# Patient Record
Sex: FEMALE | Race: WHITE | Employment: PART TIME | URBAN - METROPOLITAN AREA
[De-identification: names, ages, dates, MRNs, and addresses within clinical notes are randomized per-mention and may not be internally consistent; named-entity substitution may affect disease eponyms.]

---

## 2017-11-14 ENCOUNTER — ANESTHESIA (OUTPATIENT)
Dept: ENDOSCOPY | Age: 72
End: 2017-11-14
Payer: COMMERCIAL

## 2017-11-14 ENCOUNTER — HOSPITAL ENCOUNTER (OUTPATIENT)
Age: 72
Setting detail: OUTPATIENT SURGERY
Discharge: HOME | End: 2017-11-14
Attending: SPECIALIST | Admitting: SPECIALIST
Payer: COMMERCIAL

## 2017-11-14 ENCOUNTER — ANESTHESIA EVENT (OUTPATIENT)
Dept: ENDOSCOPY | Age: 72
End: 2017-11-14
Payer: COMMERCIAL

## 2017-11-14 VITALS
OXYGEN SATURATION: 97 % | HEIGHT: 66 IN | WEIGHT: 222 LBS | BODY MASS INDEX: 35.68 KG/M2 | SYSTOLIC BLOOD PRESSURE: 114 MMHG | HEART RATE: 64 BPM | RESPIRATION RATE: 24 BRPM | DIASTOLIC BLOOD PRESSURE: 77 MMHG | TEMPERATURE: 97.8 F

## 2017-11-14 LAB
H PYLORI FROM TISSUE: POSITIVE
KIT LOT NO., HCLOLOT: ABNORMAL
NEGATIVE CONTROL: NEGATIVE
POSITIVE CONTROL: POSITIVE

## 2017-11-14 PROCEDURE — 74011000250 HC RX REV CODE- 250

## 2017-11-14 PROCEDURE — 74011250637 HC RX REV CODE- 250/637: Performed by: ANESTHESIOLOGY

## 2017-11-14 PROCEDURE — 88305 TISSUE EXAM BY PATHOLOGIST: CPT | Performed by: SPECIALIST

## 2017-11-14 PROCEDURE — 76040000019: Performed by: SPECIALIST

## 2017-11-14 PROCEDURE — 74011250636 HC RX REV CODE- 250/636: Performed by: PHYSICIAN ASSISTANT

## 2017-11-14 PROCEDURE — 77030009426 HC FCPS BIOP ENDOSC BSC -B: Performed by: SPECIALIST

## 2017-11-14 PROCEDURE — C1726 CATH, BAL DIL, NON-VASCULAR: HCPCS | Performed by: SPECIALIST

## 2017-11-14 PROCEDURE — 74011250636 HC RX REV CODE- 250/636

## 2017-11-14 PROCEDURE — 76060000031 HC ANESTHESIA FIRST 0.5 HR: Performed by: SPECIALIST

## 2017-11-14 PROCEDURE — 87077 CULTURE AEROBIC IDENTIFY: CPT | Performed by: PHYSICIAN ASSISTANT

## 2017-11-14 RX ORDER — MIDAZOLAM HYDROCHLORIDE 1 MG/ML
.25-5 INJECTION, SOLUTION INTRAMUSCULAR; INTRAVENOUS
Status: DISCONTINUED | OUTPATIENT
Start: 2017-11-14 | End: 2017-11-14 | Stop reason: HOSPADM

## 2017-11-14 RX ORDER — NALOXONE HYDROCHLORIDE 0.4 MG/ML
0.4 INJECTION, SOLUTION INTRAMUSCULAR; INTRAVENOUS; SUBCUTANEOUS
Status: DISCONTINUED | OUTPATIENT
Start: 2017-11-14 | End: 2017-11-14 | Stop reason: HOSPADM

## 2017-11-14 RX ORDER — PROPOFOL 10 MG/ML
INJECTION, EMULSION INTRAVENOUS AS NEEDED
Status: DISCONTINUED | OUTPATIENT
Start: 2017-11-14 | End: 2017-11-14 | Stop reason: HOSPADM

## 2017-11-14 RX ORDER — METOPROLOL TARTRATE 50 MG/1
50 TABLET ORAL EVERY 12 HOURS
Status: DISCONTINUED | OUTPATIENT
Start: 2017-11-14 | End: 2017-11-14 | Stop reason: HOSPADM

## 2017-11-14 RX ORDER — FLUMAZENIL 0.1 MG/ML
0.2 INJECTION INTRAVENOUS
Status: DISCONTINUED | OUTPATIENT
Start: 2017-11-14 | End: 2017-11-14 | Stop reason: HOSPADM

## 2017-11-14 RX ORDER — ATOMOXETINE 25 MG/1
25 CAPSULE ORAL DAILY
Status: ON HOLD | COMMUNITY
End: 2018-07-06

## 2017-11-14 RX ORDER — FENTANYL CITRATE 50 UG/ML
100 INJECTION, SOLUTION INTRAMUSCULAR; INTRAVENOUS ONCE
Status: DISCONTINUED | OUTPATIENT
Start: 2017-11-14 | End: 2017-11-14 | Stop reason: HOSPADM

## 2017-11-14 RX ORDER — SODIUM CHLORIDE 9 MG/ML
INJECTION, SOLUTION INTRAVENOUS
Status: DISCONTINUED | OUTPATIENT
Start: 2017-11-14 | End: 2017-11-14 | Stop reason: HOSPADM

## 2017-11-14 RX ORDER — ATROPINE SULFATE 0.1 MG/ML
0.5 INJECTION INTRAVENOUS
Status: DISCONTINUED | OUTPATIENT
Start: 2017-11-14 | End: 2017-11-14 | Stop reason: HOSPADM

## 2017-11-14 RX ORDER — DULOXETIN HYDROCHLORIDE 20 MG/1
20 CAPSULE, DELAYED RELEASE ORAL DAILY
COMMUNITY
End: 2021-06-18

## 2017-11-14 RX ORDER — SODIUM CHLORIDE 9 MG/ML
50 INJECTION, SOLUTION INTRAVENOUS CONTINUOUS
Status: DISCONTINUED | OUTPATIENT
Start: 2017-11-14 | End: 2017-11-14 | Stop reason: HOSPADM

## 2017-11-14 RX ORDER — EPINEPHRINE 0.1 MG/ML
1 INJECTION INTRACARDIAC; INTRAVENOUS
Status: DISCONTINUED | OUTPATIENT
Start: 2017-11-14 | End: 2017-11-14 | Stop reason: HOSPADM

## 2017-11-14 RX ORDER — LIDOCAINE HYDROCHLORIDE 20 MG/ML
INJECTION, SOLUTION EPIDURAL; INFILTRATION; INTRACAUDAL; PERINEURAL AS NEEDED
Status: DISCONTINUED | OUTPATIENT
Start: 2017-11-14 | End: 2017-11-14 | Stop reason: HOSPADM

## 2017-11-14 RX ORDER — DEXTROMETHORPHAN/PSEUDOEPHED 2.5-7.5/.8
1.2 DROPS ORAL
Status: DISCONTINUED | OUTPATIENT
Start: 2017-11-14 | End: 2017-11-14 | Stop reason: HOSPADM

## 2017-11-14 RX ORDER — PANTOPRAZOLE SODIUM 20 MG/1
20 TABLET, DELAYED RELEASE ORAL DAILY
Qty: 60 TAB | Refills: 0 | Status: SHIPPED | OUTPATIENT
Start: 2017-11-14 | End: 2018-01-13

## 2017-11-14 RX ADMIN — PROPOFOL 100 MG: 10 INJECTION, EMULSION INTRAVENOUS at 07:42

## 2017-11-14 RX ADMIN — LIDOCAINE HYDROCHLORIDE 10 MG: 20 INJECTION, SOLUTION EPIDURAL; INFILTRATION; INTRACAUDAL; PERINEURAL at 07:39

## 2017-11-14 RX ADMIN — PROPOFOL 50 MG: 10 INJECTION, EMULSION INTRAVENOUS at 07:39

## 2017-11-14 RX ADMIN — LIDOCAINE HYDROCHLORIDE 35 MG: 20 INJECTION, SOLUTION EPIDURAL; INFILTRATION; INTRACAUDAL; PERINEURAL at 07:46

## 2017-11-14 RX ADMIN — PROPOFOL 50 MG: 10 INJECTION, EMULSION INTRAVENOUS at 07:46

## 2017-11-14 RX ADMIN — SODIUM CHLORIDE: 9 INJECTION, SOLUTION INTRAVENOUS at 06:59

## 2017-11-14 RX ADMIN — PROPOFOL 40 MG: 10 INJECTION, EMULSION INTRAVENOUS at 07:41

## 2017-11-14 RX ADMIN — LIDOCAINE HYDROCHLORIDE 20 MG: 20 INJECTION, SOLUTION EPIDURAL; INFILTRATION; INTRACAUDAL; PERINEURAL at 07:42

## 2017-11-14 RX ADMIN — SODIUM CHLORIDE 50 ML/HR: 900 INJECTION, SOLUTION INTRAVENOUS at 07:26

## 2017-11-14 RX ADMIN — METOPROLOL TARTRATE 50 MG: 50 TABLET, FILM COATED ORAL at 07:24

## 2017-11-14 RX ADMIN — LIDOCAINE HYDROCHLORIDE 10 MG: 20 INJECTION, SOLUTION EPIDURAL; INFILTRATION; INTRACAUDAL; PERINEURAL at 07:41

## 2017-11-14 RX ADMIN — PROPOFOL 50 MG: 10 INJECTION, EMULSION INTRAVENOUS at 07:44

## 2017-11-14 NOTE — PROCEDURES
[address] [facility] GREG Ellis MD  [phone]      2017    Esophagogastroduodenoscopy (EGD) Procedure Note  Carrie Berman  : 1945  [facility] Medical Record Number: [id number]      Indications:    GERD  Referring Physician:  Sergio Bruce MD  Anesthesia/Sedation: Conscious Sedation/Moderate Sedation  Endoscopist:  Dr. Samuel Oconnell  Complications:  None  Estimated Blood Loss:  None    Permit:  The indications, risks, benefits and alternatives were reviewed with the patient or their decision maker who was provided an opportunity to ask questions and all questions were answered. The specific risks of esophagogastroduodenoscopy with conscious sedation were reviewed, including but not limited to anesthetic complication, bleeding, adverse drug reaction, missed lesion, infection, IV site reactions, and intestinal perforation which would lead to the need for surgical repair. Alternatives to EGD including radiographic imaging, observation without testing, or laboratory testing were reviewed as well as the limitations of those alternatives discussed. After considering the options and having all their questions answered, the patient or their decision maker provided both verbal and written consent to proceed. Procedure in Detail:  After obtaining informed consent, positioning of the patient in the left lateral decubitus position, and conduction of a pre-procedure pause or \"time out\" the endoscope was introduced into the mouth and advanced to the duodenum. A careful inspection was made, and findings or interventions are described below. Findings:   Esophagus:Acquired Schatzki's ring at GE junction, biopsied with cold forceps and dilated with 18mm to 20mm balloon. Hemostasis confirmed.   Stomach: Diffuse gastric erythema with a few erosions, biopsied for helicobacter with cold forceps. Duodenum/jejunum: normal      Specimens: See above    Impression: Gastritis, acquired schatzki's ring. Recommendations:  -Acid suppression with a proton pump inhibitor for 2 months. ,   -Await pathology. Thank you for entrusting me with this patient's care. Please do not hesitate to contact me with any questions or if I can be of assistance with any of your other patients' GI needs. Signed By: Terrell Ceja MD                        November 14, 2017     ----------------------  Pyloritec POSITIVE - eradication regimen provided.   Terrell Ceja MD

## 2017-11-14 NOTE — INTERVAL H&P NOTE
Pre-Endoscopy H&P Update  Chief complaint/HPI/ROS:  The indication for the procedure, the patient's history and the patient's current medications are reviewed prior to the procedure and that data is reported on the H&P to which this document is attached. Any significant complaints with regard to organ systems will be noted, and if not mentioned then a review of systems is not contributory. ACTUALLY this is open access EGD for GERD and globus sensation. Is using H2RA with fair to good control. No weight loss or bleeding. Past Medical History:   Diagnosis Date    ADD (attention deficit disorder with hyperactivity)     Hypertension     Hypothyroid     Insomnia     Obesity     PAF (paroxysmal atrial fibrillation) ([facility] [address]     initially in 2008, with more spells since then (last documented 7/13 at [facility])      Past Surgical History:   Procedure Laterality Date    CARDIAC CATHETERIZATION  5/08    normal    ECHO 2D ADULT  5/08    normal    HX APPENDECTOMY      HX ORTHOPAEDIC      knee, rotator cuff    HX OTHER SURGICAL      Echo 4/6/12 - normal, LVEF 60-65%      Social   Social History   Substance Use Topics    Smoking status: Former Smoker    Smokeless tobacco: Not on file    Alcohol use Yes      Comment: social      Family History   Problem Relation Age of Onset    Cancer Mother     Cancer Father       Allergies   Allergen Reactions    Sulfa (Sulfonamide Antibiotics) Other (comments)     Reports headaches. Prior to Admission Medications   Prescriptions Last Dose Informant Patient Reported? Taking? BUPROPION HCL (WELLBUTRIN PO) Not Taking at Unknown time  Yes No   Sig: Take  by mouth. DULoxetine (CYMBALTA) 20 mg capsule 11/13/2017 at Unknown time  Yes Yes   Sig: Take 20 mg by mouth daily. XARELTO 20 mg tab tablet 11/10/2017 at Unknown time  No Yes   Sig: TAKE 1 TABLET BY MOUTH DAILY   atomoxetine (STRATTERA) 25 mg capsule 11/13/2017 at Unknown time  Yes Yes   Sig: Take 25 mg by mouth daily. flecainide (TAMBOCOR) 50 mg tablet Not Taking at Unknown time  No No   Sig: Take 1 Tab by mouth two (2) times a day. hydrochlorothiazide (HYDRODIURIL) 25 mg tablet 11/13/2017 at Unknown time  Yes Yes   Sig: Take 25 mg by mouth daily. levothyroxine (SYNTHROID) 25 mcg tablet 11/13/2017 at Unknown time  Yes Yes   Sig: Take  by mouth Daily (before breakfast). metoprolol (LOPRESSOR) 50 mg tablet 11/13/2017 at Unknown time  No Yes   Sig: Take 0.5 Tabs by mouth two (2) times a day. Facility-Administered Medications: None       PHYSICAL EXAM:  The patient is examined immediately prior to the procedure. Visit Vitals    /68    Pulse (!) 57    Temp 97.7 °F (36.5 °C)    Resp 14    Ht 5' 6\" (1.676 m)    Wt 100.7 kg (222 lb)    SpO2 97%    BMI 35.83 kg/m2     Gen: Appears comfortable, no distress. Pulm: comfortable respirations with no abnormal audible breath sounds  CV: heart regular, well perfused  GI: abdomen flat. PLAN:  Informed consent discussion held, patient afforded an opportunity to ask questions and all questions answered. After being advised of the risks, benefits, and alternatives, the patient requested that we proceed and indicated so on a written consent form. Will proceed with procedure as planned.   Carmen Ortiz MD

## 2017-11-14 NOTE — H&P
67 y.o. female for open access colonoscopy for screening   Additional data for completion of the targeted pre-endoscopy H&P will be provided under 'H&P interval notes'. Please see that document which will be attached to this.   Tessy Dan MD

## 2017-11-14 NOTE — ANESTHESIA POSTPROCEDURE EVALUATION
Post-Anesthesia Evaluation and Assessment    Patient: Dariela Heller MRN: [id number]  SSN: [id number]    YOB: 1945  Age: 67 y.o. Sex: female       Cardiovascular Function/Vital Signs  Visit Vitals    /61    Pulse 61    Temp 36.6 °C (97.8 °F)    Resp 22    Ht 5' 6\" (1.676 m)    Wt 100.7 kg (222 lb)    SpO2 97%    BMI 35.83 kg/m2       Patient is status post MAC anesthesia for Procedure(s):  ESOPHAGOGASTRODUODENOSCOPY (EGD)  ESOPHAGOGASTRODUODENAL (EGD) BIOPSY  ESOPHAGEAL DILATION. Nausea/Vomiting: None    Postoperative hydration reviewed and adequate. Pain:  Pain Scale 1: Numeric (0 - 10) (11/14/17 0801)  Pain Intensity 1: 0 (11/14/17 0801)   Managed    Neurological Status: At baseline    Mental Status and Level of Consciousness: Arousable    Pulmonary Status:   O2 Device: Nasal cannula (11/14/17 0801)   Adequate oxygenation and airway patent    Complications related to anesthesia: None    Post-anesthesia assessment completed.  No concerns    Signed By: Vinod Valdes MD     November 14, 2017

## 2017-11-14 NOTE — DISCHARGE INSTRUCTIONS
[address] [facility] GREG Diehl MD  [phone]      November 14, 2017     Josephine Roberto  YOB: 1945    ENDOSCOPY DISCHARGE INSTRUCTIONS    If there is redness at IV site you should apply warm compress to area. If redness or soreness persist contact Dr. Jeannine Diehl' or your primary care doctor. Gaseous discomfort may develop, but walking, belching will help relieve this. You may not operate a vehicle for 12 hours  You may not operate machinery or dangerous appliances for rest of today  You may not drink alcoholic beverages for 12 hours  Avoid making any critical decisions for 24 hours    DIET:  You may resume your normal diet, but some patients find that heavy or large meals may lead to indigestion or vomiting. I suggest a light meal as first food intake. MEDICATIONS:  The use of some over-the-counter pain medication may lead to bleeding after biopsies or other procedures you may have had done. Tylenol (also called acetaminophen) is safe to take and will not lead to bleeding. Based on the procedure you had today you may not safely take aspirin or aspirin-like products for the next ten (10) days. ACTIVITY:  You may resume your normal household activities, but it is recommended that you spend the remainder of the day resting -  avoid any strenuous activity. CALL DR. Sin Rangel' OFFICE IF:  Increasing pain, nausea, vomiting  Abdominal distension (swelling)  Significant new or increased bleeding (oral or rectal)  Fever/Chills  Chest pain/shortness of breath                   Additional instructions:   No aspirin 10 days  Resume xarelto in 2 days if no bleeding  We found a narrowing of the esophagus and some acid irritation. Take the new stomach medication for 2 months then stop. It was an honor to be your doctor today.   Please let me or my office staff know if you have any feedback about today's procedure. Katrina Francisco MD    Pantoprazole (By mouth)   Pantoprazole (pan-TOE-pra-zole)  Treats gastroesophageal reflux disease (GERD), a damaged esophagus, and high levels of stomach acid. This medicine is a proton pump inhibitor (PPI). Brand Name(s): Protonix   There may be other brand names for this medicine. When This Medicine Should Not Be Used: This medicine is not right for everyone. Do not use it if you had an allergic reaction to pantoprazole or similar medicines. How to Use This Medicine:   Packet, Tablet, Delayed Release Tablet, Long Acting Tablet  · Your doctor will tell you how much medicine to use. Do not use more than directed. Take the medicine at least 30 minutes before a meal.  · Delayed-release tablet: Swallow the tablet whole. Do not crush, break, or chew it. · Delayed-release packet:   ¨ To prepare with applesauce:   § Mix the packet contents with 1 teaspoon of applesauce. Do not mix with water, or other liquids or food. Do not divide the packet contents to make smaller doses. § Swallow the mixture within 10 minutes after you mix it. Do not chew or crush the granules. § Sip some water after you take the mixture to make sure you swallow all of the medicine. ¨ To prepare with apple juice:   § Mix the packet contents with 1 teaspoon of apple juice in a small cup. Do not divide the packet contents to make smaller doses. § Stir for 5 seconds and drink the mixture immediately. Do not chew or crush the granules. § To make sure you get all of the medicine, add more apple juice to the cup. Drink it immediately. ¨ To prepare for a feeding tube:   § Pour the packet contents in a 2-ounce (60 milliliter [mL]) catheter-tip syringe. § Add 10 mL of apple juice to the syringe. Add the mixture to the tube. Gently tap or shake the barrel of the syringe to help empty it. § Add 10 mL of apple juice to the syringe and put it in the tube. Do this at least 2 times.  There should be no granules left in the syringe. · This medicine should come with a Medication Guide. Ask your pharmacist for a copy if you do not have one. · Missed dose: Take a dose as soon as you remember. If it is almost time for your next dose, wait until then and take a regular dose. Do not take extra medicine to make up for a missed dose. · Store the medicine in a closed container at room temperature, away from heat, moisture, and direct light. Drugs and Foods to Avoid:   Ask your doctor or pharmacist before using any other medicine, including over-the-counter medicines, vitamins, and herbal products. · Some foods and medicines can affect how pantoprazole works. Tell your doctor if you are using any of the following:   ¨ Ampicillin, atazanavir, digoxin, erlotinib, ketoconazole, methotrexate, mycophenolate mofetil, nelfinavir  ¨ Blood thinner (including warfarin)  ¨ Diuretic (water pill)  ¨ Iron supplements  Warnings While Using This Medicine:   · Tell your doctor if you are pregnant or breastfeeding, or if you have liver disease, lupus, or osteoporosis. · This medicine may cause the following problems:  ¨ Kidney problems  ¨ Low vitamin B12 or magnesium levels  ¨ Increased risk of broken bones in the hip, wrist, or spine  · This medicine can cause diarrhea. Call your doctor if the diarrhea becomes severe, does not stop, or is bloody. Do not take any medicine to stop diarrhea until you have talked to your doctor. Diarrhea can occur 2 months or more after you stop taking this medicine. · Tell any doctor or dentist who treats you that you are using this medicine. This medicine may affect certain medical test results. · Your doctor will check your progress and the effects of this medicine at regular visits. Keep all appointments. · Keep all medicine out of the reach of children. Never share your medicine with anyone.   Possible Side Effects While Using This Medicine:   Call your doctor right away if you notice any of these side effects:  · Allergic reaction: Itching or hives, swelling in your face or hands, swelling or tingling in your mouth or throat, chest tightness, trouble breathing  · Blistering, peeling, red skin rash  · Fever, joint pain, swelling in your body, unusual weight gain, change in how much or how often you urinate  · Joint pain, rash on your cheeks or arms that gets worse in the sun  · Seizures, dizziness, uneven heartbeat, muscle cramps or twitching  · Severe diarrhea, stomach cramps, fever  · Stomach pain, nausea, vomiting, weight loss  If you notice other side effects that you think are caused by this medicine, tell your doctor. Call your doctor for medical advice about side effects. You may report side effects to FDA at 7-421-FDA-9639  © 2017 [address] [employer] Information is for End User's use only and may not be sold, redistributed or otherwise used for commercial purposes. The above information is an  only. It is not intended as medical advice for individual conditions or treatments. Talk to your doctor, nurse or pharmacist before following any medical regimen to see if it is safe and effective for you.

## 2017-11-14 NOTE — IP AVS SNAPSHOT
Shaq Pascal 
 
 
 [address] 
[phone] Patient: Kayce Madrid MRN: [id number] About your hospitalization You were admitted on:  November 14, 2017 You last received care in the:  [facility] ENDOSCOPY You were discharged on:  November 14, 2017 Why you were hospitalized Your primary diagnosis was:  Not on File Discharge Orders None A check marla indicates which time of day the medication should be taken. My Medications TAKE these medications as instructed Instructions Each Dose to Equal  
 Morning Noon Evening Bedtime DULoxetine 20 mg capsule Commonly known as:  CYMBALTA Your last dose was: Your next dose is: Take 20 mg by mouth daily. 20 mg  
    
   
   
   
  
 flecainide 50 mg tablet Commonly known as:  TAMBOCOR Your last dose was: Your next dose is: Take 1 Tab by mouth two (2) times a day. 50 mg  
    
   
   
   
  
 hydroCHLOROthiazide 25 mg tablet Commonly known as:  HYDRODIURIL Your last dose was: Your next dose is: Take 25 mg by mouth daily. 25 mg  
    
   
   
   
  
 levothyroxine 25 mcg tablet Commonly known as:  SYNTHROID Your last dose was: Your next dose is: Take  by mouth Daily (before breakfast). metoprolol tartrate 50 mg tablet Commonly known as:  LOPRESSOR Your last dose was: Your next dose is: Take 0.5 Tabs by mouth two (2) times a day. 25 mg  
    
   
   
   
  
 pantoprazole 20 mg tablet Commonly known as:  PROTONIX Your last dose was: Your next dose is: Take 1 Tab by mouth daily for 60 days. 20 mg  
    
   
   
   
  
 STRATTERA 25 mg capsule Generic drug:  atomoxetine Your last dose was: Your next dose is: Take 25 mg by mouth daily. 25 mg WELLBUTRIN PO Your last dose was: Your next dose is: Take  by mouth. XARELTO 20 mg Tab tablet Generic drug:  rivaroxaban Your last dose was: Your next dose is: TAKE 1 TABLET BY MOUTH DAILY Where to Get Your Medications Information on where to get these meds will be given to you by the nurse or doctor. ! Ask your nurse or doctor about these medications  
  pantoprazole 20 mg tablet Discharge Instructions [address] Venkates[address] Abdi. Jemma Willis, [address] [place] [address] 
[phone] November 14, 2017 Henry Carreon YOB: 1945 ENDOSCOPY DISCHARGE INSTRUCTIONS If there is redness at IV site you should apply warm compress to area. If redness or soreness persist contact Dr. Jemma Willis' or your primary care doctor. Gaseous discomfort may develop, but walking, belching will help relieve this. You may not operate a vehicle for 12 hours You may not operate machinery or dangerous appliances for rest of today You may not drink alcoholic beverages for 12 hours Avoid making any critical decisions for 24 hours DIET: 
You may resume your normal diet, but some patients find that heavy or large meals may lead to indigestion or vomiting. I suggest a light meal as first food intake. MEDICATIONS: 
The use of some over-the-counter pain medication may lead to bleeding after biopsies or other procedures you may have had done. Tylenol (also called acetaminophen) is safe to take and will not lead to bleeding. Based on the procedure you had today you may not safely take aspirin or aspirin-like products for the next ten (10) days.    
 
ACTIVITY: 
You may resume your normal household activities, but it is recommended that you spend the remainder of the day resting -  avoid any strenuous activity. CALL DR. Rossy Bearden' OFFICE IF: Increasing pain, nausea, vomiting Abdominal distension (swelling) Significant new or increased bleeding (oral or rectal) Fever/Chills Chest pain/shortness of breath Additional instructions:  
No aspirin 10 days Resume xarelto in 2 days if no bleeding We found a narrowing of the esophagus and some acid irritation. Take the new stomach medication for 2 months then stop. It was an honor to be your doctor today. Please let me or my office staff know if you have any feedback about today's procedure. Carmen Fernandez MD 
 
Pantoprazole (By mouth) Pantoprazole (pan-TOE-pra-zole) Treats gastroesophageal reflux disease (GERD), a damaged esophagus, and high levels of stomach acid. This medicine is a proton pump inhibitor (PPI). Brand Name(s): Protonix There may be other brand names for this medicine. When This Medicine Should Not Be Used: This medicine is not right for everyone. Do not use it if you had an allergic reaction to pantoprazole or similar medicines. How to Use This Medicine:  
Packet, Tablet, Delayed Release Tablet, Long Acting Tablet · Your doctor will tell you how much medicine to use. Do not use more than directed. Take the medicine at least 30 minutes before a meal. 
· Delayed-release tablet: Swallow the tablet whole. Do not crush, break, or chew it. · Delayed-release packet: ¨ To prepare with applesauce: § Mix the packet contents with 1 teaspoon of applesauce. Do not mix with water, or other liquids or food. Do not divide the packet contents to make smaller doses. § Swallow the mixture within 10 minutes after you mix it. Do not chew or crush the granules. § Sip some water after you take the mixture to make sure you swallow all of the medicine. ¨ To prepare with apple juice: § Mix the packet contents with 1 teaspoon of apple juice in a small cup. Do not divide the packet contents to make smaller doses. § Stir for 5 seconds and drink the mixture immediately. Do not chew or crush the granules. § To make sure you get all of the medicine, add more apple juice to the cup. Drink it immediately. ¨ To prepare for a feeding tube: § Pour the packet contents in a 2-ounce (60 milliliter [mL]) catheter-tip syringe. § Add 10 mL of apple juice to the syringe. Add the mixture to the tube. Gently tap or shake the barrel of the syringe to help empty it. § Add 10 mL of apple juice to the syringe and put it in the tube. Do this at least 2 times. There should be no granules left in the syringe. · This medicine should come with a Medication Guide. Ask your pharmacist for a copy if you do not have one. · Missed dose: Take a dose as soon as you remember. If it is almost time for your next dose, wait until then and take a regular dose. Do not take extra medicine to make up for a missed dose. · Store the medicine in a closed container at room temperature, away from heat, moisture, and direct light. Drugs and Foods to Avoid: Ask your doctor or pharmacist before using any other medicine, including over-the-counter medicines, vitamins, and herbal products. · Some foods and medicines can affect how pantoprazole works. Tell your doctor if you are using any of the following: ¨ Ampicillin, atazanavir, digoxin, erlotinib, ketoconazole, methotrexate, mycophenolate mofetil, nelfinavir ¨ Blood thinner (including warfarin) ¨ Diuretic (water pill) ¨ Iron supplements Warnings While Using This Medicine: · Tell your doctor if you are pregnant or breastfeeding, or if you have liver disease, lupus, or osteoporosis. · This medicine may cause the following problems: ¨ Kidney problems ¨ Low vitamin B12 or magnesium levels ¨ Increased risk of broken bones in the hip, wrist, or spine · This medicine can cause diarrhea. Call your doctor if the diarrhea becomes severe, does not stop, or is bloody. Do not take any medicine to stop diarrhea until you have talked to your doctor. Diarrhea can occur 2 months or more after you stop taking this medicine. · Tell any doctor or dentist who treats you that you are using this medicine. This medicine may affect certain medical test results. · Your doctor will check your progress and the effects of this medicine at regular visits. Keep all appointments. · Keep all medicine out of the reach of children. Never share your medicine with anyone. Possible Side Effects While Using This Medicine:  
Call your doctor right away if you notice any of these side effects: · Allergic reaction: Itching or hives, swelling in your face or hands, swelling or tingling in your mouth or throat, chest tightness, trouble breathing · Blistering, peeling, red skin rash · Fever, joint pain, swelling in your body, unusual weight gain, change in how much or how often you urinate · Joint pain, rash on your cheeks or arms that gets worse in the sun · Seizures, dizziness, uneven heartbeat, muscle cramps or twitching · Severe diarrhea, stomach cramps, fever · Stomach pain, nausea, vomiting, weight loss If you notice other side effects that you think are caused by this medicine, tell your doctor. Call your doctor for medical advice about side effects. You may report side effects to FDA at 5-637-FDA-0718 © 2017 [address] Information is for End User's use only and may not be sold, redistributed or otherwise used for commercial purposes. The above information is an  only. It is not intended as medical advice for individual conditions or treatments. Talk to your doctor, nurse or pharmacist before following any medical regimen to see if it is safe and effective for you. Introducing [facility]! Sharita Agee introduces [facility] patient portal. Now you can access parts of your medical record, email your doctor's office, and request medication refills online. 1. In your internet browser, go to [email or web address] 2. Click on the First Time User? Click Here link in the Sign In box. You will see the New Member Sign Up page. 3. Enter your [facility] Access Code exactly as it appears below. You will not need to use this code after youve completed the sign-up process. If you do not sign up before the expiration date, you must request a new code. · [facility] [facility] Access Code: [email or web address] Expires: 2/12/2018  6:04 AM 
 
4. Enter the last four digits of your Social Security Number (xxxx) and Date of Birth (mm/dd/yyyy) as indicated and click Submit. You will be taken to the next sign-up page. 5. Create a [facility] ID. This will be your [facility] login ID and cannot be changed, so think of one that is secure and easy to remember. 6. Create a [facility] password. You can change your password at any time. 7. Enter your Password Reset Question and Answer. This can be used at a later time if you forget your password. 8. Enter your e-mail address. You will receive e-mail notification when new information is available in [address] 9. Click Sign Up. You can now view and download portions of your medical record. 10. Click the Download Summary menu link to download a portable copy of your medical information. If you have questions, please visit the Frequently Asked Questions section of the [facility] website. Remember, [facility] is NOT to be used for urgent needs. For medical emergencies, dial 911. Now available from your iPhone and Android! Providers Seen During Your Hospitalization Provider Specialty Primary office phone Carmen Fernandez MD Gastroenterology [phone] Your Primary Care Physician (PCP) Primary Care Physician Office Phone Office Fax Jordyn Naranjo [phone] You are allergic to the following Allergen Reactions Sulfa (Sulfonamide Antibiotics) Other (comments) Reports headaches. Recent Documentation Height Weight BMI OB Status Smoking Status 1.676 m 100.7 kg 35.83 kg/m2 Postmenopausal Former Smoker Emergency Contacts Name Discharge Info Relation Home Work Mobile [address] CAREGIVER [3] Spouse [3] [phone] Patient Belongings The following personal items are in your possession at time of discharge: 
  Dental Appliances: Partials  Visual Aid: None Please provide this summary of care documentation to your next provider. Signatures-by signing, you are acknowledging that this After Visit Summary has been reviewed with you and you have received a copy. Patient Signature:  ____________________________________________________________ Date:  ____________________________________________________________  
  
[facility] Provider Signature:  ____________________________________________________________ Date:  ____________________________________________________________

## 2017-11-14 NOTE — PERIOP NOTES
Patient medication teaching was done for Pantoprazole and pylera. [address] were included with discharge instructions. Opportunities for questions given and clarification was provided. patient and spouse verbalized understanding and acceptance. Medication side effects guide given and reviewed with patient and spouse.

## 2018-07-05 RX ORDER — DOXYCYCLINE HYCLATE 50 MG/1
50 CAPSULE ORAL 2 TIMES DAILY
Status: ON HOLD | COMMUNITY
End: 2020-08-28

## 2018-07-06 ENCOUNTER — ANESTHESIA EVENT (OUTPATIENT)
Dept: ENDOSCOPY | Age: 73
End: 2018-07-06
Payer: COMMERCIAL

## 2018-07-06 ENCOUNTER — HOSPITAL ENCOUNTER (OUTPATIENT)
Age: 73
Setting detail: OUTPATIENT SURGERY
Discharge: HOME | End: 2018-07-06
Attending: SPECIALIST | Admitting: SPECIALIST
Payer: COMMERCIAL

## 2018-07-06 ENCOUNTER — ANESTHESIA (OUTPATIENT)
Dept: ENDOSCOPY | Age: 73
End: 2018-07-06
Payer: COMMERCIAL

## 2018-07-06 VITALS
RESPIRATION RATE: 16 BRPM | WEIGHT: 222 LBS | TEMPERATURE: 98.2 F | HEART RATE: 59 BPM | DIASTOLIC BLOOD PRESSURE: 67 MMHG | SYSTOLIC BLOOD PRESSURE: 120 MMHG | HEIGHT: 67 IN | BODY MASS INDEX: 34.84 KG/M2 | OXYGEN SATURATION: 97 %

## 2018-07-06 PROCEDURE — 74011250636 HC RX REV CODE- 250/636

## 2018-07-06 PROCEDURE — 88305 TISSUE EXAM BY PATHOLOGIST: CPT | Performed by: SPECIALIST

## 2018-07-06 PROCEDURE — 74011000250 HC RX REV CODE- 250

## 2018-07-06 PROCEDURE — 74011250637 HC RX REV CODE- 250/637: Performed by: SPECIALIST

## 2018-07-06 PROCEDURE — 77030013992 HC SNR POLYP ENDOSC BSC -B: Performed by: SPECIALIST

## 2018-07-06 PROCEDURE — 74011250636 HC RX REV CODE- 250/636: Performed by: SPECIALIST

## 2018-07-06 PROCEDURE — 76040000019: Performed by: SPECIALIST

## 2018-07-06 PROCEDURE — 76060000031 HC ANESTHESIA FIRST 0.5 HR: Performed by: SPECIALIST

## 2018-07-06 RX ORDER — DEXTROMETHORPHAN/PSEUDOEPHED 2.5-7.5/.8
1.2 DROPS ORAL
Status: DISCONTINUED | OUTPATIENT
Start: 2018-07-06 | End: 2018-07-06 | Stop reason: HOSPADM

## 2018-07-06 RX ORDER — MELATONIN
3000 DAILY
COMMUNITY

## 2018-07-06 RX ORDER — PROPOFOL 10 MG/ML
INJECTION, EMULSION INTRAVENOUS
Status: DISCONTINUED | OUTPATIENT
Start: 2018-07-06 | End: 2018-07-06 | Stop reason: HOSPADM

## 2018-07-06 RX ORDER — LANOLIN ALCOHOL/MO/W.PET/CERES
1000 CREAM (GRAM) TOPICAL DAILY
COMMUNITY

## 2018-07-06 RX ORDER — MIDAZOLAM HYDROCHLORIDE 1 MG/ML
.25-5 INJECTION, SOLUTION INTRAMUSCULAR; INTRAVENOUS AS NEEDED
Status: DISCONTINUED | OUTPATIENT
Start: 2018-07-06 | End: 2018-07-06 | Stop reason: HOSPADM

## 2018-07-06 RX ORDER — SODIUM CHLORIDE 9 MG/ML
50 INJECTION, SOLUTION INTRAVENOUS CONTINUOUS
Status: DISCONTINUED | OUTPATIENT
Start: 2018-07-06 | End: 2018-07-06 | Stop reason: HOSPADM

## 2018-07-06 RX ORDER — LIDOCAINE HYDROCHLORIDE 20 MG/ML
INJECTION, SOLUTION EPIDURAL; INFILTRATION; INTRACAUDAL; PERINEURAL AS NEEDED
Status: DISCONTINUED | OUTPATIENT
Start: 2018-07-06 | End: 2018-07-06 | Stop reason: HOSPADM

## 2018-07-06 RX ORDER — SODIUM CHLORIDE 9 MG/ML
INJECTION, SOLUTION INTRAVENOUS
Status: DISCONTINUED | OUTPATIENT
Start: 2018-07-06 | End: 2018-07-06 | Stop reason: HOSPADM

## 2018-07-06 RX ORDER — FENTANYL CITRATE 50 UG/ML
25 INJECTION, SOLUTION INTRAMUSCULAR; INTRAVENOUS AS NEEDED
Status: DISCONTINUED | OUTPATIENT
Start: 2018-07-06 | End: 2018-07-06 | Stop reason: HOSPADM

## 2018-07-06 RX ORDER — BISMUTH SUBSALICYLATE 262 MG
1 TABLET,CHEWABLE ORAL DAILY
COMMUNITY

## 2018-07-06 RX ORDER — PROPOFOL 10 MG/ML
INJECTION, EMULSION INTRAVENOUS AS NEEDED
Status: DISCONTINUED | OUTPATIENT
Start: 2018-07-06 | End: 2018-07-06 | Stop reason: HOSPADM

## 2018-07-06 RX ORDER — NALOXONE HYDROCHLORIDE 0.4 MG/ML
0.4 INJECTION, SOLUTION INTRAMUSCULAR; INTRAVENOUS; SUBCUTANEOUS
Status: DISCONTINUED | OUTPATIENT
Start: 2018-07-06 | End: 2018-07-06 | Stop reason: HOSPADM

## 2018-07-06 RX ORDER — FLUMAZENIL 0.1 MG/ML
0.2 INJECTION INTRAVENOUS
Status: DISCONTINUED | OUTPATIENT
Start: 2018-07-06 | End: 2018-07-06 | Stop reason: HOSPADM

## 2018-07-06 RX ADMIN — SIMETHICONE 80 MG: 20 SUSPENSION/ DROPS ORAL at 08:37

## 2018-07-06 RX ADMIN — SODIUM CHLORIDE 50 ML/HR: 900 INJECTION, SOLUTION INTRAVENOUS at 08:00

## 2018-07-06 RX ADMIN — PROPOFOL 120 MCG/KG/MIN: 10 INJECTION, EMULSION INTRAVENOUS at 08:27

## 2018-07-06 RX ADMIN — SODIUM CHLORIDE: 9 INJECTION, SOLUTION INTRAVENOUS at 08:21

## 2018-07-06 RX ADMIN — PROPOFOL 70 MG: 10 INJECTION, EMULSION INTRAVENOUS at 08:27

## 2018-07-06 RX ADMIN — LIDOCAINE HYDROCHLORIDE 50 MG: 20 INJECTION, SOLUTION EPIDURAL; INFILTRATION; INTRACAUDAL; PERINEURAL at 08:27

## 2018-07-06 NOTE — PROCEDURES
[address] GREG Billings MD  [phone]      2018    Colonoscopy Procedure Note  Arabella Bynum  :  1945  [place] Medical Record Number: [id number]    Indications:     Screening colonoscopy  PCP:  Marcos Wylie MD  Anesthesia/Sedation: Conscious Sedation/Moderate Sedation  Endoscopist:  Dr. Kadie Ramon  Complications:  None  Estimated Blood Loss:  None    Permit:  The indications, risks, benefits and alternatives were reviewed with the patient or their decision maker who was provided an opportunity to ask questions and all questions were answered. The specific risks of colonoscopy with conscious sedation were reviewed, including but not limited to anesthetic complication, bleeding, adverse drug reaction, missed lesion, infection, IV site reactions, and intestinal perforation which would lead to the need for surgical repair. Alternatives to colonoscopy including radiographic imaging, observation without testing, or laboratory testing were reviewed including the limitations of those alternatives. After considering the options and having all their questions answered, the patient or their decision maker provided both verbal and written consent to proceed. Procedure in Detail:  After obtaining informed consent, positioning of the patient in the left lateral decubitus position, and conduction of a pre-procedure pause or \"time out\" the endoscope was introduced into the anus and advanced to the cecum, which was identified by the ileocecal valve and appendiceal orifice. The quality of the colonic preparation was good. A careful inspection was made as the colonoscope was withdrawn, findings and interventions are described below. Findings:   3mm polyp of the ascending colon removed with cold snare and all samples retrieved.   In the rectum, medium internal hemorrhoids are noted without bleeding. Specimens:    See above    Complications:   None; patient tolerated the procedure well. Impression:  Colon polyp removed    Recommendations:     - Await pathology. Thank you for entrusting me with this patient's care. Please do not hesitate to contact me with any questions or if I can be of assistance with any of your other patients' GI needs.     Signed By: Dieudonne Jaramillo MD                        July 6, 2018

## 2018-07-06 NOTE — INTERVAL H&P NOTE
Pre-Endoscopy H&P Update  Chief complaint/HPI/ROS:  The indication for the procedure, the patient's history and the patient's current medications are reviewed prior to the procedure and that data is reported on the H&P to which this document is attached. Any significant complaints with regard to organ systems will be noted, and if not mentioned then a review of systems is not contributory. Past Medical History:   Diagnosis Date    ADD (attention deficit disorder with hyperactivity)     GERD (gastroesophageal reflux disease)     Hypertension     Hypothyroid     Insomnia     Obesity     PAF (paroxysmal atrial fibrillation) ([facility] [address]     initially in 2008, with more spells since then (last documented 7/13 at [facility])      Past Surgical History:   Procedure Laterality Date    CARDIAC CATHETERIZATION  5/08    normal    ECHO 2D ADULT  5/08    normal    HX APPENDECTOMY      HX ORTHOPAEDIC Bilateral 2006, 2005    rotator cuff    HX ORTHOPAEDIC Bilateral 2008, 2008    knee replacement    HX OTHER SURGICAL      Echo 4/6/12 - normal, LVEF 60-65%      Social   Social History   Substance Use Topics    Smoking status: Former Smoker     Years: 10.00     Quit date: 1979    Smokeless tobacco: Never Used    Alcohol use Yes      Comment: social,        Family History   Problem Relation Age of Onset    Cancer Mother     Cancer Father       Allergies   Allergen Reactions    Sulfa (Sulfonamide Antibiotics) Other (comments)     Reports headaches. Prior to Admission Medications   Prescriptions Last Dose Informant Patient Reported? Taking? DULoxetine (CYMBALTA) 20 mg capsule 7/5/2018 at am  Yes Yes   Sig: Take 20 mg by mouth daily. XARELTO 20 mg tab tablet 7/2/2018 at am  No No   Sig: TAKE 1 TABLET BY MOUTH DAILY   cholecalciferol (VITAMIN D3) 1,000 unit tablet   Yes Yes   Sig: Take 1,000 Units by mouth daily. cyanocobalamin (VITAMIN B-12) 1,000 mcg tablet   Yes Yes   Sig: Take 1,000 mcg by mouth daily.    doxycycline (VIBRAMYCIN) 50 mg capsule 7/5/2018 at am  Yes Yes   Sig: Take 50 mg by mouth two (2) times a day. flecainide (TAMBOCOR) 50 mg tablet 5/23/2018  No No   Sig: Take 1 Tab by mouth two (2) times a day. Patient taking differently: Take 50 mg by mouth two (2) times daily as needed. Indications: PAROXYSMAL ATRIAL FIBRILLATION   hydrochlorothiazide (HYDRODIURIL) 25 mg tablet 7/5/2018 at am  Yes Yes   Sig: Take 25 mg by mouth daily. levothyroxine (SYNTHROID) 25 mcg tablet 7/5/2018 at am  Yes Yes   Sig: Take  by mouth Daily (before breakfast). metoprolol (LOPRESSOR) 50 mg tablet 7/6/2018 at Unknown time  No Yes   Sig: Take 0.5 Tabs by mouth two (2) times a day. Patient taking differently: Take 50 mg by mouth two (2) times a day. multivitamin (ONE A DAY) tablet   Yes Yes   Sig: Take 1 Tab by mouth daily. Facility-Administered Medications: None       PHYSICAL EXAM:  The patient is examined immediately prior to the procedure. Visit Vitals    /67    Pulse (!) 59    Temp 98.2 °F (36.8 °C)    Resp 16    Ht 5' 7\" (1.702 m)    Wt 100.7 kg (222 lb)    SpO2 97%    Breastfeeding No    BMI 34.77 kg/m2     Gen: Appears comfortable, no distress. Pulm: comfortable respirations with no abnormal audible breath sounds  CV: heart regular, well perfused  GI: abdomen flat. PLAN:  Informed consent discussion held, patient afforded an opportunity to ask questions and all questions answered. After being advised of the risks, benefits, and alternatives, the patient requested that we proceed and indicated so on a written consent form. Will proceed with procedure as planned.   Victor Manuel Washburn MD

## 2018-07-06 NOTE — H&P
68 y.o. female for open access colonoscopy for screening   Additional data for completion of the targeted pre-endoscopy H&P will be provided under 'H&P interval notes'. Please see that document which will be attached to this.   Ivan Medina MD

## 2018-07-06 NOTE — IP AVS SNAPSHOT
[address] 
[phone] Patient: Damian Virgen MRN: [id number] About your hospitalization You were admitted on:  July 6, 2018 You last received care in the:  [facility] ENDOSCOPY You were discharged on:  July 6, 2018 Why you were hospitalized Your primary diagnosis was:  Not on File Follow-up Information None Discharge Orders None A check marla indicates which time of day the medication should be taken. My Medications CHANGE how you take these medications Instructions Each Dose to Equal  
 Morning Noon Evening Bedtime  
 flecainide 50 mg tablet Commonly known as:  TAMBOCOR What changed:   
- when to take this 
- reasons to take this Your last dose was: Your next dose is: Take 1 Tab by mouth two (2) times a day. 50 mg  
    
   
   
   
  
 metoprolol tartrate 50 mg tablet Commonly known as:  LOPRESSOR What changed:  how much to take Your last dose was: Your next dose is: Take 0.5 Tabs by mouth two (2) times a day. 25 mg CONTINUE taking these medications Instructions Each Dose to Equal  
 Morning Noon Evening Bedtime  
 doxycycline 50 mg capsule Commonly known as:  VIBRAMYCIN Your last dose was: Your next dose is: Take 50 mg by mouth two (2) times a day. 50 mg DULoxetine 20 mg capsule Commonly known as:  CYMBALTA Your last dose was: Your next dose is: Take 20 mg by mouth daily. 20 mg  
    
   
   
   
  
 hydroCHLOROthiazide 25 mg tablet Commonly known as:  HYDRODIURIL Your last dose was: Your next dose is: Take 25 mg by mouth daily. 25 mg  
    
   
   
   
  
 levothyroxine 25 mcg tablet Commonly known as:  SYNTHROID  
   
 Your last dose was: Your next dose is: Take  by mouth Daily (before breakfast). multivitamin tablet Commonly known as:  ONE A DAY Your last dose was: Your next dose is: Take 1 Tab by mouth daily. 1 Tab VITAMIN B-12 1,000 mcg tablet Generic drug:  cyanocobalamin Your last dose was: Your next dose is: Take 1,000 mcg by mouth daily. 1000 mcg VITAMIN D3 1,000 unit tablet Generic drug:  cholecalciferol Your last dose was: Your next dose is: Take 1,000 Units by mouth daily. 1000 Units XARELTO 20 mg Tab tablet Generic drug:  rivaroxaban Your last dose was: Your next dose is: TAKE 1 TABLET BY MOUTH DAILY Discharge Instructions [address] Zerita Phoenix. Alec Brothers, [address] [place] [address] 
[phone] July 6, 2018 [facility] YOB: 1945 COLONOSCOPY DISCHARGE INSTRUCTIONS If there is redness at IV site you should apply warm compress to area. If redness or soreness persist contact Dr. Alec Brothers' or your primary care doctor. There may be a slight amount of blood passed from the rectum. Gaseous discomfort may develop, but walking, belching will help relieve this. You may not operate a vehicle for 12 hours You may not operate machinery or dangerous appliances for rest of today You may not drink alcoholic beverages for 12 hours Avoid making any critical decisions for 24 hours DIET: 
You may resume your normal diet, but some patients find that heavy or large meals may lead to indigestion or vomiting. I suggest a light meal as first food intake.  
 
MEDICATIONS: 
The use of some over-the-counter pain medication may lead to bleeding after colon biopsies or polyp removal.  Tylenol (also called acetaminophen) is safe to take even if you have had colonoscopy with polyp removal.  Based on the procedure you had today you may not safely take aspirin or aspirin-like products for the next ten (10) days. Remember that Tylenol (also called acetaminophen) is safe to take after colonoscopy even if you have had biopsies or polyps removed. ACTIVITY: 
You may resume your normal household activities, but it is recommended that you spend the remainder of the day resting -  avoid any strenuous activity. CALL DR. Kandi Segovia' OFFICE IF: Increasing pain, nausea, vomiting Abdominal distension (swelling) Significant new or increased bleeding (oral or rectal) Fever/Chills Chest pain/shortness of breath Additional instructions: We found one small polyp and removed that today I will contact you with the polyp results and give advice about next colonoscopy based on those results Hold aspirin 10 days You can resume your anticoagulant medication in 2 days (on Sunday) if there is no bleeding in that interval.  
 
It was an honor to be your doctor today. Please let me or my office staff know if you have any feedback about today's procedure. Jalil Trujillo MD 
 
Colonoscopy saves lives, and can prevent colon cancer. Everyone aged 48 or older needs colonoscopy. Tell your family and friends: get the test! 
 
 
 
 
  
  
  
Introducing IPLocks! [facility] introduces [employer] patient portal. Now you can access parts of your medical record, email your doctor's office, and request medication refills online. 1. In your internet browser, go to [email or web address] 2. Click on the First Time User? Click Here link in the Sign In box. You will see the New Member Sign Up page. 3. Enter your [employer] Access Code exactly as it appears below.  You will not need to use this code after youve completed the sign-up process. If you do not sign up before the expiration date, you must request a new code. · [employer] Access Code: [email or web address] Expires: 10/4/2018  7:00 AM 
 
4. Enter the last four digits of your Social Security Number (xxxx) and Date of Birth (mm/dd/yyyy) as indicated and click Submit. You will be taken to the next sign-up page. 5. Create a [employer] ID. This will be your [employer] login ID and cannot be changed, so think of one that is secure and easy to remember. 6. Create a [employer] password. You can change your password at any time. 7. Enter your Password Reset Question and Answer. This can be used at a later time if you forget your password. 8. Enter your e-mail address. You will receive e-mail notification when new information is available in [address] 9. Click Sign Up. You can now view and download portions of your medical record. 10. Click the Download Summary menu link to download a portable copy of your medical information. If you have questions, please visit the Frequently Asked Questions section of the [employer] website. Remember, [employer] is NOT to be used for urgent needs. For medical emergencies, dial 911. Now available from your iPhone and Android! Introducing Robert Alfaro As a [employer] patient, I wanted to make you aware of our electronic visit tool called Robert Alfaro. [employer] 24/7 allows you to connect within minutes with a medical provider 24 hours a day, seven days a week via a mobile device or tablet or logging into a secure website from your computer. You can access Robert Alfaro from anywhere in the [place].  
 
A virtual visit might be right for you when you have a simple condition and feel like you just dont want to get out of bed, or cant get away from work for an appointment, when your regular [employer] provider is not available (evenings, weekends or holidays), or when youre out of town and need minor care. Electronic visits cost only $49 and if the [email or web address] 24/7 provider determines a prescription is needed to treat your condition, one can be electronically transmitted to a nearby pharmacy*. Please take a moment to enroll today if you have not already done so. The enrollment process is free and takes just a few minutes. To enroll, please download the Plug[employer]dj 24/7 bobby to your tablet or phone, or visit [email or web address] to enroll on your computer. And, as an [address] [place] [address] patient with a [employer] account, the results of your visits will be scanned into your electronic medical record and your primary care provider will be able to view the scanned results. We urge you to continue to see your regular [email or web address] provider for your ongoing medical care. And while your primary care provider may not be the one available when you seek a Fengguo virtual visit, the peace of mind you get from getting a real diagnosis real time can be priceless. For more information on Fengguo, view our Frequently Asked Questions (FAQs) at [email or web address]. Sincerely, 
 
Lucien Duffy MD 
Chief Medical Officer Owen8 Anjana Naqvi *:  certain medications cannot be prescribed via Fengguo Providers Seen During Your Hospitalization Provider Specialty Primary office phone Belen Patel MD Gastroenterology [phone] Your Primary Care Physician (PCP) Primary Care Physician Office Phone Office Fax Brenda De La Cruz [phone] You are allergic to the following Allergen Reactions Sulfa (Sulfonamide Antibiotics) Other (comments) Reports headaches. Recent Documentation Height Weight Breastfeeding? BMI OB Status Smoking Status 1.702 m 100.7 kg No 34.77 kg/m2 Postmenopausal Former Smoker Emergency Contacts Name Discharge Info Relation Home Work Mobile [address] CAREGIVER [3] Spouse [3] [phone] Patient Belongings The following personal items are in your possession at time of discharge: 
  Dental Appliances: Lowers, Partials  Visual Aid: None Please provide this summary of care documentation to your next provider. Signatures-by signing, you are acknowledging that this After Visit Summary has been reviewed with you and you have received a copy. Patient Signature:  ____________________________________________________________ Date:  ____________________________________________________________  
  
[facility] Provider Signature:  ____________________________________________________________ Date:  ____________________________________________________________

## 2018-07-06 NOTE — ANESTHESIA PREPROCEDURE EVALUATION
Anesthetic History   No history of anesthetic complications            Review of Systems / Medical History  Patient summary reviewed, nursing notes reviewed and pertinent labs reviewed    Pulmonary  Within defined limits                 Neuro/Psych   Within defined limits           Cardiovascular  Within defined limits  Hypertension: well controlled        Dysrhythmias : atrial fibrillation           GI/Hepatic/Renal  Within defined limits   GERD: well controlled           Endo/Other  Within defined limits    Hypothyroidism  Obesity     Other Findings              Physical Exam    Airway  Mallampati: I  TM Distance: 4 - 6 cm  Neck ROM: normal range of motion   Mouth opening: Normal     Cardiovascular    Rhythm: regular  Rate: normal         Dental  No notable dental hx       Pulmonary  Breath sounds clear to auscultation               Abdominal         Other Findings            Anesthetic Plan    ASA: 2  Anesthesia type: MAC            Anesthetic plan and risks discussed with: Patient

## 2018-07-06 NOTE — ROUTINE PROCESS
Carl Lamb [facility]  1945  [id number]    Situation:  Verbal report received from: Layton Crespo RN  Procedure: Procedure(s):  COLONOSCOPY  ENDOSCOPIC POLYPECTOMY    Background:    Preoperative diagnosis: FAMILY HISTORY OF COLON CANCER   Postoperative diagnosis: Polyp, hemorrhoids. :  Dr. Issa Hall  Assistant(s): Endoscopy Technician-1: Rona White  Endoscopy RN-1: Layton Crespo RN    Specimens:   ID Type Source Tests Collected by Time Destination   1 : ascending colon polyp Preservative Colon, Ascending  Cat Degroot MD 7/6/2018 7962 Pathology     H. Pylori  no    Assessment:  Intra-procedure medications     Anesthesia gave intra-procedure sedation and medications, see anesthesia flow sheet yes    Intravenous fluids: NS@ KVO     Vital signs stable     Abdominal assessment: round and soft     Recommendation:  Discharge patient per MD order.   Family or Friend   Permission to share finding with family or friend yes

## 2018-07-06 NOTE — DISCHARGE INSTRUCTIONS
[address] [facility] GREG Morales MD  [phone]      July 6, 2018    Jose Turcios  YOB: 1945    COLONOSCOPY DISCHARGE INSTRUCTIONS    If there is redness at IV site you should apply warm compress to area. If redness or soreness persist contact Dr. Yvan Morales' or your primary care doctor. There may be a slight amount of blood passed from the rectum. Gaseous discomfort may develop, but walking, belching will help relieve this. You may not operate a vehicle for 12 hours  You may not operate machinery or dangerous appliances for rest of today  You may not drink alcoholic beverages for 12 hours  Avoid making any critical decisions for 24 hours    DIET:  You may resume your normal diet, but some patients find that heavy or large meals may lead to indigestion or vomiting. I suggest a light meal as first food intake. MEDICATIONS:  The use of some over-the-counter pain medication may lead to bleeding after colon biopsies or polyp removal.  Tylenol (also called acetaminophen) is safe to take even if you have had colonoscopy with polyp removal.  Based on the procedure you had today you may not safely take aspirin or aspirin-like products for the next ten (10) days. Remember that Tylenol (also called acetaminophen) is safe to take after colonoscopy even if you have had biopsies or polyps removed. ACTIVITY:  You may resume your normal household activities, but it is recommended that you spend the remainder of the day resting -  avoid any strenuous activity. CALL DR. Diann Eubanks' OFFICE IF:  Increasing pain, nausea, vomiting  Abdominal distension (swelling)  Significant new or increased bleeding (oral or rectal)  Fever/Chills  Chest pain/shortness of breath                       Additional instructions:    We found one small polyp and removed that today  I will contact you with the polyp results and give advice about next colonoscopy based on those results  Hold aspirin 10 days  You can resume your anticoagulant medication in 2 days (on Sunday) if there is no bleeding in that interval.     It was an honor to be your doctor today. Please let me or my office staff know if you have any feedback about today's procedure. Joi Block MD    Colonoscopy saves lives, and can prevent colon cancer. Everyone aged 48 or older needs colonoscopy.   Tell your family and friends: get the test!

## 2018-07-06 NOTE — PERIOP NOTES
8591  Anesthesia staff at patient's bedside administering anesthesia and monitoring patients vital signs throughout procedure. See anesthesia note. 7896  Endoscope was pre-cleaned at bedside immediately following procedure by Manisha Fajardo endo tech. 6084  Patient tolerated procedure without problems. Abdomen soft and patient arousable and voices no complaints Report received from CRNA, see anesthesia note. Patient transported to endoscopy recovery area. Report given to Tiffani Bazan RN.

## 2018-07-06 NOTE — ANESTHESIA POSTPROCEDURE EVALUATION
Post-Anesthesia Evaluation and Assessment    Patient: Hung Ratliff MRN: [id number]  SSN: [id number]    YOB: 1945  Age: 68 y.o. Sex: female       Cardiovascular Function/Vital Signs  Visit Vitals    /67    Pulse (!) 59    Temp 36.8 °C (98.2 °F)    Resp 16    Ht 5' 7\" (1.702 m)    Wt 100.7 kg (222 lb)    SpO2 97%    Breastfeeding No    BMI 34.77 kg/m2       Patient is status post MAC anesthesia for Procedure(s):  COLONOSCOPY  ENDOSCOPIC POLYPECTOMY. Nausea/Vomiting: None    Postoperative hydration reviewed and adequate. Pain:  Pain Scale 1: Numeric (0 - 10) (07/06/18 0755)  Pain Intensity 1: 0 (07/06/18 0755)   Managed    Neurological Status: At baseline    Mental Status and Level of Consciousness: Arousable    Pulmonary Status:   O2 Device: Room air (07/06/18 0755)   Adequate oxygenation and airway patent    Complications related to anesthesia: None    Post-anesthesia assessment completed.  No concerns    Signed By: Carlos Rahman MD     July 6, 2018

## 2019-11-15 ENCOUNTER — HOSPITAL ENCOUNTER (OUTPATIENT)
Dept: CT IMAGING | Age: 74
Discharge: HOME | End: 2019-11-15
Attending: NURSE PRACTITIONER
Payer: COMMERCIAL

## 2019-11-15 DIAGNOSIS — R13.19 ESOPHAGEAL DYSPHAGIA: ICD-10-CM

## 2019-11-15 DIAGNOSIS — R19.4 CHANGE IN BOWEL HABIT: ICD-10-CM

## 2019-11-15 DIAGNOSIS — R10.13 EPIGASTRIC PAIN: ICD-10-CM

## 2019-11-15 DIAGNOSIS — R15.9 INCONTINENCE OF FECES: ICD-10-CM

## 2019-11-15 LAB — CREAT BLD-MCNC: 1 MG/DL (ref 0.6–1.3)

## 2019-11-15 PROCEDURE — 74177 CT ABD & PELVIS W/CONTRAST: CPT

## 2019-11-15 PROCEDURE — 82565 ASSAY OF CREATININE: CPT

## 2019-11-15 PROCEDURE — 74011636320 HC RX REV CODE- 636/320

## 2019-11-15 RX ADMIN — IOPAMIDOL 100 ML: 755 INJECTION, SOLUTION INTRAVENOUS at 14:35

## 2020-08-24 ENCOUNTER — HOSPITAL ENCOUNTER (OUTPATIENT)
Dept: LAB | Age: 75
Discharge: HOME | End: 2020-08-24
Payer: COMMERCIAL

## 2020-08-24 DIAGNOSIS — Z01.812 PRE-PROCEDURAL LABORATORY EXAMINATIONS: ICD-10-CM

## 2020-08-24 PROCEDURE — 87635 SARS-COV-2 COVID-19 AMP PRB: CPT

## 2020-08-25 LAB — SARS-COV-2, COV2NT: NOT DETECTED

## 2020-08-28 ENCOUNTER — HOSPITAL ENCOUNTER (OUTPATIENT)
Age: 75
Setting detail: OUTPATIENT SURGERY
Discharge: HOME | End: 2020-08-28
Attending: SPECIALIST | Admitting: SPECIALIST
Payer: COMMERCIAL

## 2020-08-28 ENCOUNTER — ANESTHESIA (OUTPATIENT)
Dept: ENDOSCOPY | Age: 75
End: 2020-08-28
Payer: COMMERCIAL

## 2020-08-28 ENCOUNTER — ANESTHESIA EVENT (OUTPATIENT)
Dept: ENDOSCOPY | Age: 75
End: 2020-08-28
Payer: COMMERCIAL

## 2020-08-28 VITALS
TEMPERATURE: 97.8 F | DIASTOLIC BLOOD PRESSURE: 82 MMHG | HEART RATE: 54 BPM | BODY MASS INDEX: 36.03 KG/M2 | OXYGEN SATURATION: 98 % | RESPIRATION RATE: 16 BRPM | HEIGHT: 66 IN | WEIGHT: 224.21 LBS | SYSTOLIC BLOOD PRESSURE: 144 MMHG

## 2020-08-28 PROCEDURE — 74011250636 HC RX REV CODE- 250/636: Performed by: NURSE ANESTHETIST, CERTIFIED REGISTERED

## 2020-08-28 PROCEDURE — 88342 IMHCHEM/IMCYTCHM 1ST ANTB: CPT

## 2020-08-28 PROCEDURE — 88305 TISSUE EXAM BY PATHOLOGIST: CPT

## 2020-08-28 PROCEDURE — 76060000031 HC ANESTHESIA FIRST 0.5 HR: Performed by: SPECIALIST

## 2020-08-28 PROCEDURE — 77030021593 HC FCPS BIOP ENDOSC BSC -A: Performed by: SPECIALIST

## 2020-08-28 PROCEDURE — 76040000019: Performed by: SPECIALIST

## 2020-08-28 PROCEDURE — 74011000250 HC RX REV CODE- 250: Performed by: NURSE ANESTHETIST, CERTIFIED REGISTERED

## 2020-08-28 PROCEDURE — 74011250636 HC RX REV CODE- 250/636: Performed by: SPECIALIST

## 2020-08-28 RX ORDER — FENTANYL CITRATE 50 UG/ML
25 INJECTION, SOLUTION INTRAMUSCULAR; INTRAVENOUS AS NEEDED
Status: DISCONTINUED | OUTPATIENT
Start: 2020-08-28 | End: 2020-08-28 | Stop reason: HOSPADM

## 2020-08-28 RX ORDER — SODIUM CHLORIDE 9 MG/ML
INJECTION, SOLUTION INTRAVENOUS
Status: DISCONTINUED | OUTPATIENT
Start: 2020-08-28 | End: 2020-08-28 | Stop reason: HOSPADM

## 2020-08-28 RX ORDER — MIDAZOLAM HYDROCHLORIDE 1 MG/ML
.25-5 INJECTION, SOLUTION INTRAMUSCULAR; INTRAVENOUS AS NEEDED
Status: DISCONTINUED | OUTPATIENT
Start: 2020-08-28 | End: 2020-08-28 | Stop reason: HOSPADM

## 2020-08-28 RX ORDER — DEXTROMETHORPHAN/PSEUDOEPHED 2.5-7.5/.8
1.2 DROPS ORAL
Status: DISCONTINUED | OUTPATIENT
Start: 2020-08-28 | End: 2020-08-28 | Stop reason: HOSPADM

## 2020-08-28 RX ORDER — PROPOFOL 10 MG/ML
INJECTION, EMULSION INTRAVENOUS
Status: DISCONTINUED | OUTPATIENT
Start: 2020-08-28 | End: 2020-08-28 | Stop reason: HOSPADM

## 2020-08-28 RX ORDER — NALOXONE HYDROCHLORIDE 0.4 MG/ML
0.4 INJECTION, SOLUTION INTRAMUSCULAR; INTRAVENOUS; SUBCUTANEOUS
Status: DISCONTINUED | OUTPATIENT
Start: 2020-08-28 | End: 2020-08-28 | Stop reason: HOSPADM

## 2020-08-28 RX ORDER — FLUMAZENIL 0.1 MG/ML
0.2 INJECTION INTRAVENOUS
Status: DISCONTINUED | OUTPATIENT
Start: 2020-08-28 | End: 2020-08-28 | Stop reason: HOSPADM

## 2020-08-28 RX ORDER — LIDOCAINE HYDROCHLORIDE 20 MG/ML
INJECTION, SOLUTION EPIDURAL; INFILTRATION; INTRACAUDAL; PERINEURAL AS NEEDED
Status: DISCONTINUED | OUTPATIENT
Start: 2020-08-28 | End: 2020-08-28 | Stop reason: HOSPADM

## 2020-08-28 RX ORDER — SODIUM CHLORIDE 9 MG/ML
50 INJECTION, SOLUTION INTRAVENOUS CONTINUOUS
Status: DISCONTINUED | OUTPATIENT
Start: 2020-08-28 | End: 2020-08-28 | Stop reason: HOSPADM

## 2020-08-28 RX ORDER — PROPOFOL 10 MG/ML
INJECTION, EMULSION INTRAVENOUS AS NEEDED
Status: DISCONTINUED | OUTPATIENT
Start: 2020-08-28 | End: 2020-08-28 | Stop reason: HOSPADM

## 2020-08-28 RX ADMIN — SODIUM CHLORIDE 50 ML/HR: 900 INJECTION, SOLUTION INTRAVENOUS at 09:55

## 2020-08-28 RX ADMIN — LIDOCAINE HYDROCHLORIDE 40 MG: 20 INJECTION, SOLUTION INTRAVENOUS at 10:22

## 2020-08-28 RX ADMIN — PROPOFOL 120 MG: 10 INJECTION, EMULSION INTRAVENOUS at 10:22

## 2020-08-28 RX ADMIN — PROPOFOL 100 MCG/KG/MIN: 10 INJECTION, EMULSION INTRAVENOUS at 10:22

## 2020-08-28 RX ADMIN — SODIUM CHLORIDE: 9 INJECTION, SOLUTION INTRAVENOUS at 10:10

## 2020-08-28 NOTE — PROGRESS NOTES
Emy Garay [facility]  1945  [id number]    Situation:  Verbal report received from: [facility] RN   Procedure: Procedure(s):  Esophagogastroduodenoscopy (EGD)  ESOPHAGOGASTRODUODENAL (EGD) BIOPSY    Background:    Preoperative diagnosis: EPIGASTRIC PAIN  CHANGE IN BOWEL HABIT  INCONTINENCE FECES  Postoperative diagnosis: gastritis. :  Dr. Loli Sy  Assistant(s): Endoscopy Technician-1: Donna Skaggs  Endoscopy RN-1: Keven Camilo RN    Specimens:   ID Type Source Tests Collected by Time Destination   1 : duodenum biopsy Preservative Duodenum  Ariana Blanchard MD 8/28/2020 1029 Pathology   2 : antrum biopsy Preservative Gastric  Ariana Blanchard MD 8/28/2020 1029 Pathology     H. Pylori  yes    Assessment:  Anesthesia gave intra-procedure sedation and medications, see anesthesia flow sheet yes    Intravenous fluids: NS@ KVO     Vital signs stable     Abdominal assessment: round and soft     Recommendation:  Discharge patient per MD order.     Family or Friend   Permission to share finding with family or friend yes

## 2020-08-28 NOTE — PROCEDURES
[address] [place], [place]  [phone]      2020    Esophagogastroduodenoscopy (EGD) Procedure Note  Iliana Wynne  : 1945  [facility] Medical Record Number: [id number]      Indications:    Abdominal pain, epigastric, Nausea  Referring Physician:  Jp Peck MD  Anesthesia/Sedation:  Conscious sedation/deep sedation/monitored anesthesia -- see notes. Endoscopist:  Dr. Irma Rich  Complications:  None  Estimated Blood Loss:  None    Permit:  The indications, risks, benefits and alternatives were reviewed with the patient or their decision maker who was provided an opportunity to ask questions and all questions were answered. The specific risks of esophagogastroduodenoscopy with conscious sedation were reviewed, including but not limited to anesthetic complication, bleeding, adverse drug reaction, missed lesion, infection, IV site reactions, and intestinal perforation which would lead to the need for surgical repair. Alternatives to EGD including radiographic imaging, observation without testing, or laboratory testing were reviewed as well as the limitations of those alternatives discussed. After considering the options and having all their questions answered, the patient or their decision maker provided both verbal and written consent to proceed. Procedure in Detail:  After obtaining informed consent, positioning of the patient in the left lateral decubitus position, and conduction of a pre-procedure pause or \"time out\" the endoscope was introduced into the mouth and advanced to the duodenum. A careful inspection was made, and findings or interventions are described below. Findings:   Esophagus:normal  Stomach: Diffuse gastric erythema with a few scattered erosions consistent with erosive gastropathy.   Cold forceps biopsies obtained for histology. Duodenum/jejunum: normal.  A biopsy was taken from the duodenal mucosa for evaluation of villous atrophy or giardiasis. Hemostasis is confirmed from biopsy sites. Specimens: See above    Impression: Erosive gastritis. Recommendations:  -Acid suppression with a proton pump inhibitor. , -Await pathology. Thank you for entrusting me with this patient's care. Please do not hesitate to contact me with any questions or if I can be of assistance with any of your other patients' GI needs. Signed By: Anuj Jimenez MD                        August 28, 2020     Surgical assistant none. Implants none unless specified.

## 2020-08-28 NOTE — PERIOP NOTES
1023  Anesthesia staff at patient's bedside administering anesthesia and monitoring patients vital signs throughout procedure. See anesthesia note. 1029  Endoscope was pre-cleaned at bedside immediately following procedure by evelina Molina. 1034  Patient tolerated procedure without problems. Abdomen soft and patient arousable and voices no complaints Report received from CRNA, see anesthesia note. Patient transported to endoscopy recovery area. Report given to Aria Chávez RN.

## 2020-08-28 NOTE — PROGRESS NOTES
Endoscopy discharge instructions have been reviewed and given to patient. The patient verbalized understanding and acceptance of instructions. Dr. Bhatti Peers discussed with spouse procedure findings and next steps.

## 2020-08-28 NOTE — H&P
76 y.o. female for open access EGD for complaints of esophageal dysphagia. Additional data for completion of the targeted pre-endoscopy H&P will be provided under 'H&P interval notes'. Please see that document which will be attached to this.   Anmol Rodriguez MD

## 2020-08-28 NOTE — INTERVAL H&P NOTE
Pre-Endoscopy H&P Update  Chief complaint/HPI/ROS:  The indication for the procedure, the patient's history and the patient's current medications are reviewed prior to the procedure and that data is reported on the H&P to which this document is attached. Any significant complaints with regard to organ systems will be noted, and if not mentioned then a review of systems is not contributory. Past Medical History:   Diagnosis Date    ADD (attention deficit disorder with hyperactivity)     Chronic pain     GERD (gastroesophageal reflux disease)     Hypertension     Hypothyroid     Ill-defined condition     fibromyalgia    Insomnia     Obesity     PAF (paroxysmal atrial fibrillation) ([facility] [address]     initially in , with more spells since then (last documented  at [facility])      Past Surgical History:   Procedure Laterality Date    CARDIAC CATHETERIZATION      normal    COLONOSCOPY N/A 2018    COLONOSCOPY performed by Rip Rosen MD at [address] [facility] ECHO 2D ADULT      normal    HX APPENDECTOMY      HX ORTHOPAEDIC Bilateral ,     rotator cuff    HX ORTHOPAEDIC Bilateral ,     knee replacement    HX OTHER SURGICAL      Echo 12 - normal, LVEF 60-65%      Social   Social History     Tobacco Use    Smoking status: Former Smoker     Years: 10.00     Last attempt to quit: 1979     Years since quittin.6    Smokeless tobacco: Never Used   Substance Use Topics    Alcohol use: Yes     Alcohol/week: 1.0 standard drinks     Types: 1 Glasses of wine per week     Comment: social,        Family History   Problem Relation Age of Onset    Cancer Mother     Cancer Father       Allergies   Allergen Reactions    Sulfa (Sulfonamide Antibiotics) Other (comments)     Reports headaches. Prior to Admission Medications   Prescriptions Last Dose Informant Patient Reported? Taking? DULoxetine (CYMBALTA) 20 mg capsule 2020  Yes Yes   Sig: Take 20 mg by mouth daily.    OTHER 2020 at Unknown time  Yes Yes   Si Applicator daily. Uses CBD oil once a day. XARELTO 20 mg tab tablet 2020  No Yes   Sig: TAKE 1 TABLET BY MOUTH DAILY   cholecalciferol (VITAMIN D3) 1,000 unit tablet 2020 at am  Yes Yes   Sig: Take 1,000 Units by mouth daily. cyanocobalamin (VITAMIN B-12) 1,000 mcg tablet 2020 at Unknown time  Yes Yes   Sig: Take 1,000 mcg by mouth daily. flecainide (TAMBOCOR) 50 mg tablet 2019  No Yes   Sig: Take 1 Tab by mouth two (2) times a day. Patient taking differently: Take 50 mg by mouth two (2) times daily as needed. Indications: PAROXYSMAL ATRIAL FIBRILLATION   hydrochlorothiazide (HYDRODIURIL) 25 mg tablet 2020 at Unknown time  Yes Yes   Sig: Take 25 mg by mouth two (2) times a day. levothyroxine (SYNTHROID) 25 mcg tablet 2020 at 0300  Yes Yes   Sig: Take  by mouth Daily (before breakfast). metoprolol (LOPRESSOR) 50 mg tablet 2020 at 0730  No Yes   Sig: Take 0.5 Tabs by mouth two (2) times a day. Patient taking differently: Take 50 mg by mouth two (2) times a day. multivitamin (ONE A DAY) tablet 2020 at Unknown time  Yes Yes   Sig: Take 1 Tab by mouth daily. Facility-Administered Medications: None       PHYSICAL EXAM:  The patient is examined immediately prior to the procedure. Visit Vitals  /65   Pulse (!) 54   Temp 97.7 °F (36.5 °C)   Resp 19   Ht 5' 6\" (1.676 m)   Wt 101.7 kg (224 lb 3.3 oz)   SpO2 99%   Breastfeeding No   BMI 36.19 kg/m²     Gen: Appears comfortable, no distress. Pulm: comfortable respirations with no abnormal audible breath sounds  HEART: well perfused, no abnormal audible heart sounds  GI: abdomen flat. PLAN:  Informed consent discussion held, patient afforded an opportunity to ask questions and all questions answered. After being advised of the risks, benefits, and alternatives, the patient requested that we proceed and indicated so on a written consent form.       Will proceed with procedure as planned.   Nick Spencer MD

## 2020-08-28 NOTE — ANESTHESIA POSTPROCEDURE EVALUATION
Procedure(s):  Esophagogastroduodenoscopy (EGD)  ESOPHAGOGASTRODUODENAL (EGD) BIOPSY. MAC    Anesthesia Post Evaluation      Multimodal analgesia: multimodal analgesia not used between 6 hours prior to anesthesia start to PACU discharge  Patient location during evaluation: PACU  Patient participation: complete - patient participated  Level of consciousness: awake  Pain management: adequate  Airway patency: patent  Anesthetic complications: no  Cardiovascular status: acceptable, blood pressure returned to baseline and hemodynamically stable  Respiratory status: acceptable  Hydration status: acceptable  Post anesthesia nausea and vomiting:  controlled  Final Post Anesthesia Temperature Assessment:  Normothermia (36.0-37.5 degrees C)      INITIAL Post-op Vital signs:   Vitals Value Taken Time   /82 8/28/2020 10:56 AM   Temp 36.6 °C (97.8 °F) 8/28/2020 10:38 AM   Pulse 50 8/28/2020 10:59 AM   Resp 17 8/28/2020 10:59 AM   SpO2 98 % 8/28/2020 10:40 AM   Vitals shown include unvalidated device data.

## 2020-08-28 NOTE — DISCHARGE INSTRUCTIONS
[address] [facility] GREG Gomez MD  [phone]      August 28, 2020     Belkis Plata  YOB: 1945    ENDOSCOPY DISCHARGE INSTRUCTIONS    If there is redness at IV site you should apply warm compress to area. If redness or soreness persist contact Dr. Melonie Gomez' or your primary care doctor. Gaseous discomfort may develop, but walking, belching will help relieve this. You may not operate a vehicle for 12 hours  You may not operate machinery or dangerous appliances for rest of today  You may not drink alcoholic beverages for 12 hours  Avoid making any critical decisions for 24 hours    DIET:  You may resume your normal diet, but some patients find that heavy or large meals may lead to indigestion or vomiting. I suggest a light meal as first food intake. MEDICATIONS:  The use of some over-the-counter pain medication may lead to bleeding after biopsies or other procedures you may have had done. Tylenol (also called acetaminophen) is safe to take and will not lead to bleeding. Based on the procedure you had today you may not safely take aspirin or aspirin-like products for the next ten (10) days. ACTIVITY:  You may resume your normal household activities, but it is recommended that you spend the remainder of the day resting -  avoid any strenuous activity. CALL DR. Deisi Crespo' OFFICE IF:  Increasing pain, nausea, vomiting  Abdominal distension (swelling)  Significant new or increased bleeding (oral or rectal)  Fever/Chills  Chest pain/shortness of breath                   Additional instructions:   No aspirin 10 days, hold xarelto and restart on Saturday (tomorrow). We found acid irritation of the stomach and I took biopsies to look for the ulcer bacteria (h pylori).   I will contact you with the biopsy results, but I want you to go ahead and start a medication called pantoprazole every morning before breakfast to see if that makes you feel better. I sent that prescription electronically through my office computer this morning. It was an honor to be your doctor today. Please let me or my office staff know if you have any feedback about today's procedure.     Rich Lima MD

## 2020-09-29 ENCOUNTER — HOSPITAL ENCOUNTER (EMERGENCY)
Age: 75
Discharge: HOME | End: 2020-09-29
Attending: EMERGENCY MEDICINE | Admitting: EMERGENCY MEDICINE
Payer: COMMERCIAL

## 2020-09-29 VITALS
SYSTOLIC BLOOD PRESSURE: 148 MMHG | BODY MASS INDEX: 35.36 KG/M2 | OXYGEN SATURATION: 99 % | HEIGHT: 66 IN | TEMPERATURE: 97.6 F | WEIGHT: 220 LBS | HEART RATE: 73 BPM | DIASTOLIC BLOOD PRESSURE: 83 MMHG | RESPIRATION RATE: 18 BRPM

## 2020-09-29 DIAGNOSIS — I48.91 ATRIAL FIBRILLATION WITH RVR (HCC): Primary | ICD-10-CM

## 2020-09-29 LAB
ANION GAP SERPL CALC-SCNC: 7 MMOL/L (ref 5–15)
BASOPHILS # BLD: 0.1 K/UL (ref 0–0.1)
BASOPHILS NFR BLD: 1 % (ref 0–1)
BUN SERPL-MCNC: 29 MG/DL (ref 6–20)
BUN/CREAT SERPL: 21 (ref 12–20)
CALCIUM SERPL-MCNC: 9 MG/DL (ref 8.5–10.1)
CHLORIDE SERPL-SCNC: 103 MMOL/L (ref 97–108)
CO2 SERPL-SCNC: 29 MMOL/L (ref 21–32)
COMMENT, HOLDF: NORMAL
CREAT SERPL-MCNC: 1.41 MG/DL (ref 0.55–1.02)
DIFFERENTIAL METHOD BLD: ABNORMAL
EOSINOPHIL # BLD: 0.1 K/UL (ref 0–0.4)
EOSINOPHIL NFR BLD: 1 % (ref 0–7)
ERYTHROCYTE [DISTWIDTH] IN BLOOD BY AUTOMATED COUNT: 13.1 % (ref 11.5–14.5)
GLUCOSE SERPL-MCNC: 100 MG/DL (ref 65–100)
HCT VFR BLD AUTO: 48 % (ref 35–47)
HGB BLD-MCNC: 16.2 G/DL (ref 11.5–16)
IMM GRANULOCYTES # BLD AUTO: 0 K/UL (ref 0–0.04)
IMM GRANULOCYTES NFR BLD AUTO: 0 % (ref 0–0.5)
LYMPHOCYTES # BLD: 2 K/UL (ref 0.8–3.5)
LYMPHOCYTES NFR BLD: 22 % (ref 12–49)
MCH RBC QN AUTO: 31 PG (ref 26–34)
MCHC RBC AUTO-ENTMCNC: 33.8 G/DL (ref 30–36.5)
MCV RBC AUTO: 92 FL (ref 80–99)
MONOCYTES # BLD: 0.7 K/UL (ref 0–1)
MONOCYTES NFR BLD: 8 % (ref 5–13)
NEUTS SEG # BLD: 6.5 K/UL (ref 1.8–8)
NEUTS SEG NFR BLD: 68 % (ref 32–75)
NRBC # BLD: 0 K/UL (ref 0–0.01)
NRBC BLD-RTO: 0 PER 100 WBC
PLATELET # BLD AUTO: 339 K/UL (ref 150–400)
PMV BLD AUTO: 10.9 FL (ref 8.9–12.9)
POTASSIUM SERPL-SCNC: 3.4 MMOL/L (ref 3.5–5.1)
RBC # BLD AUTO: 5.22 M/UL (ref 3.8–5.2)
SAMPLES BEING HELD,HOLD: NORMAL
SODIUM SERPL-SCNC: 139 MMOL/L (ref 136–145)
WBC # BLD AUTO: 9.4 K/UL (ref 3.6–11)

## 2020-09-29 PROCEDURE — 74011000250 HC RX REV CODE- 250: Performed by: EMERGENCY MEDICINE

## 2020-09-29 PROCEDURE — 93005 ELECTROCARDIOGRAM TRACING: CPT

## 2020-09-29 PROCEDURE — 74011250637 HC RX REV CODE- 250/637: Performed by: EMERGENCY MEDICINE

## 2020-09-29 PROCEDURE — 36415 COLL VENOUS BLD VENIPUNCTURE: CPT

## 2020-09-29 PROCEDURE — 85025 COMPLETE CBC W/AUTO DIFF WBC: CPT

## 2020-09-29 PROCEDURE — 99285 EMERGENCY DEPT VISIT HI MDM: CPT

## 2020-09-29 PROCEDURE — 80048 BASIC METABOLIC PNL TOTAL CA: CPT

## 2020-09-29 PROCEDURE — 96374 THER/PROPH/DIAG INJ IV PUSH: CPT

## 2020-09-29 PROCEDURE — 74011250636 HC RX REV CODE- 250/636: Performed by: EMERGENCY MEDICINE

## 2020-09-29 RX ORDER — DILTIAZEM HYDROCHLORIDE 5 MG/ML
25 INJECTION INTRAVENOUS
Status: COMPLETED | OUTPATIENT
Start: 2020-09-29 | End: 2020-09-29

## 2020-09-29 RX ORDER — POTASSIUM CHLORIDE 750 MG/1
40 TABLET, FILM COATED, EXTENDED RELEASE ORAL
Status: COMPLETED | OUTPATIENT
Start: 2020-09-29 | End: 2020-09-29

## 2020-09-29 RX ORDER — METOPROLOL TARTRATE 50 MG/1
50 TABLET ORAL 2 TIMES DAILY
Qty: 60 TAB | Refills: 0 | Status: SHIPPED | OUTPATIENT
Start: 2020-09-29 | End: 2020-10-29

## 2020-09-29 RX ADMIN — SODIUM CHLORIDE 1000 ML: 900 INJECTION, SOLUTION INTRAVENOUS at 17:03

## 2020-09-29 RX ADMIN — DILTIAZEM HYDROCHLORIDE 15 MG: 5 INJECTION INTRAVENOUS at 17:18

## 2020-09-29 RX ADMIN — POTASSIUM CHLORIDE 40 MEQ: 750 TABLET, FILM COATED, EXTENDED RELEASE ORAL at 18:08

## 2020-09-29 NOTE — ED PROVIDER NOTES
Palpitations    This is a recurrent problem. Episode onset: 3 days ago. The problem has not changed since onset. The problem occurs constantly. The problem is associated with nothing. Associated symptoms include irregular heartbeat and shortness of breath (chronic and unchanged). Pertinent negatives include no diaphoresis, no chest pain, no exertional chest pressure, no orthopnea and no syncope.         Past Medical History:   Diagnosis Date    ADD (attention deficit disorder with hyperactivity)     Chronic pain     GERD (gastroesophageal reflux disease)     Hypertension     Hypothyroid     Ill-defined condition     fibromyalgia    Insomnia     Obesity     PAF (paroxysmal atrial fibrillation) ([facility] [address]     initially in , with more spells since then (last documented  at [facility])       Past Surgical History:   Procedure Laterality Date    CARDIAC CATHETERIZATION      normal    COLONOSCOPY N/A 2018    COLONOSCOPY performed by Caleb Mathews MD at [address] [facility] ECHO 2D ADULT      normal    HX APPENDECTOMY      HX ORTHOPAEDIC Bilateral ,     rotator cuff    HX ORTHOPAEDIC Bilateral ,     knee replacement    HX OTHER SURGICAL      Echo 12 - normal, LVEF 60-65%          Family History:   Problem Relation Age of Onset    Cancer Mother     Cancer Father        Social History     Socioeconomic History    Marital status:      Spouse name: Not on file    Number of children: Not on file    Years of education: Not on file    Highest education level: Not on file   Occupational History    Not on file   Social Needs    Financial resource strain: Not on file    Food insecurity     Worry: Not on file     Inability: Not on file   [employer] needs     Medical: Not on file     Non-medical: Not on file   Tobacco Use    Smoking status: Former Smoker     Years: 10.00     Last attempt to quit: 1979     Years since quittin.7    Smokeless tobacco: Never Used Substance and Sexual Activity    Alcohol use: Yes     Alcohol/week: 1.0 standard drinks     Types: 1 Glasses of wine per week     Comment: social,      Drug use: No    Sexual activity: Not on file   Lifestyle    Physical activity     Days per week: Not on file     Minutes per session: Not on file    Stress: Not on file   Relationships    Social connections     Talks on phone: Not on file     Gets together: Not on file     Attends Confucianist service: Not on file     Active member of club or organization: Not on file     Attends meetings of clubs or organizations: Not on file     Relationship status: Not on file    Intimate partner violence     Fear of current or ex partner: Not on file     Emotionally abused: Not on file     Physically abused: Not on file     Forced sexual activity: Not on file   Other Topics Concern    Not on file   Social History Narrative    Not on file         ALLERGIES: Sulfa (sulfonamide antibiotics)    Review of Systems   Constitutional: Negative for diaphoresis. Respiratory: Positive for shortness of breath (chronic and unchanged). Cardiovascular: Positive for palpitations. Negative for chest pain, orthopnea and syncope. Vitals:    09/29/20 1640   BP: (!) 154/97   Pulse: (!) 132   Resp: 28   Temp: 97.6 °F (36.4 °C)   SpO2: 99%   Weight: 99.8 kg (220 lb)   Height: 5' 6\" (1.676 m)            Physical Exam  Vitals signs and nursing note reviewed. Constitutional:       Appearance: She is well-developed. HENT:      Head: Normocephalic and atraumatic. Eyes:      General: No scleral icterus. Neck:      Musculoskeletal: Normal range of motion. Cardiovascular:      Rate and Rhythm: Tachycardia present. Rhythm irregular. Pulmonary:      Effort: Pulmonary effort is normal.   Abdominal:      General: There is no distension. Musculoskeletal:      Right lower leg: No edema. Left lower leg: No edema. Skin:     General: Skin is warm and dry.       Findings: No erythema or rash. Neurological:      General: No focal deficit present. Mental Status: She is alert and oriented to person, place, and time. Psychiatric:         Mood and Affect: Mood normal.         Behavior: Behavior normal.          MDM  Number of Diagnoses or Management Options  Diagnosis management comments: DDX: A. fib with RVR, shock, V. tach, atrial flutter, SVT, electrolyte abnormality, dehydration         Procedures      EKG at 4:42 PM  Atrial fibrillation with rapid ventricular response and a rate of 128 bpm, occasional aberrantly conducted complexes versus PVCs, normal axis, no STEMI  EKG interpreted by Rohan Posey MD       EKG done at 5:56 PM  Atrial fibrillation, 73 bpm, occasional PVCs versus aberrantly conducted complexes, no STEMI  EKG interpreted by Rohan Posey MD       6:21 PM  Discussed with Dr. Kita Syed, cardiology, who agrees that the patient should increase her metoprolol dose back to 50 mg twice daily. She can follow-up with their office this week for additional management. The patient's results have been reviewed with them and/or available family. Patient and/or family verbally conveyed their understanding and agreement of the patient's signs, symptoms, diagnosis, treatment and prognosis and additionally agree to follow up as recommended in the discharge instructions or to return to the Emergency Room should their condition change prior to their follow-up appointment. The patient/family verbally agrees with the care-plan and verbally conveys that all of their questions have been answered. The discharge instructions have also been provided to the patient and/or family with some educational information regarding the patient's diagnosis as well a list of reasons why the patient would want to return to the ER prior to their follow-up appointment, should their condition change.

## 2020-09-29 NOTE — ED TRIAGE NOTES
Patient arrives with c/o afib episode since Sunday; was seen by Dr. Gypsy Vela today and referred here for afib with RVR. Patient is clammy.

## 2020-09-30 LAB
ATRIAL RATE: 93 BPM
ATRIAL RATE: 94 BPM
CALCULATED R AXIS, ECG10: -10 DEGREES
CALCULATED R AXIS, ECG10: 18 DEGREES
CALCULATED T AXIS, ECG11: -12 DEGREES
CALCULATED T AXIS, ECG11: 50 DEGREES
DIAGNOSIS, 93000: NORMAL
DIAGNOSIS, 93000: NORMAL
Q-T INTERVAL, ECG07: 366 MS
Q-T INTERVAL, ECG07: 412 MS
QRS DURATION, ECG06: 144 MS
QRS DURATION, ECG06: 84 MS
QTC CALCULATION (BEZET), ECG08: 453 MS
QTC CALCULATION (BEZET), ECG08: 534 MS
VENTRICULAR RATE, ECG03: 128 BPM
VENTRICULAR RATE, ECG03: 73 BPM

## 2020-10-01 ENCOUNTER — TELEPHONE (OUTPATIENT)
Dept: CARDIOLOGY CLINIC | Age: 75
End: 2020-10-01

## 2020-10-05 ENCOUNTER — OFFICE VISIT (OUTPATIENT)
Dept: CARDIOLOGY CLINIC | Age: 75
End: 2020-10-05
Payer: COMMERCIAL

## 2020-10-05 ENCOUNTER — DOCUMENTATION ONLY (OUTPATIENT)
Dept: CARDIOLOGY CLINIC | Age: 75
End: 2020-10-05

## 2020-10-05 VITALS
WEIGHT: 224 LBS | DIASTOLIC BLOOD PRESSURE: 78 MMHG | BODY MASS INDEX: 36 KG/M2 | HEART RATE: 57 BPM | HEIGHT: 66 IN | OXYGEN SATURATION: 100 % | SYSTOLIC BLOOD PRESSURE: 120 MMHG

## 2020-10-05 DIAGNOSIS — E66.01 SEVERE OBESITY (HCC): ICD-10-CM

## 2020-10-05 DIAGNOSIS — I48.0 PAF (PAROXYSMAL ATRIAL FIBRILLATION) (HCC): Primary | ICD-10-CM

## 2020-10-05 DIAGNOSIS — I10 ESSENTIAL HYPERTENSION: ICD-10-CM

## 2020-10-05 PROCEDURE — G8510 SCR DEP NEG, NO PLAN REQD: HCPCS | Performed by: INTERNAL MEDICINE

## 2020-10-05 PROCEDURE — 93000 ELECTROCARDIOGRAM COMPLETE: CPT | Performed by: INTERNAL MEDICINE

## 2020-10-05 PROCEDURE — G8536 NO DOC ELDER MAL SCRN: HCPCS | Performed by: INTERNAL MEDICINE

## 2020-10-05 PROCEDURE — 1090F PRES/ABSN URINE INCON ASSESS: CPT | Performed by: INTERNAL MEDICINE

## 2020-10-05 PROCEDURE — G8400 PT W/DXA NO RESULTS DOC: HCPCS | Performed by: INTERNAL MEDICINE

## 2020-10-05 PROCEDURE — G8427 DOCREV CUR MEDS BY ELIG CLIN: HCPCS | Performed by: INTERNAL MEDICINE

## 2020-10-05 PROCEDURE — G8417 CALC BMI ABV UP PARAM F/U: HCPCS | Performed by: INTERNAL MEDICINE

## 2020-10-05 PROCEDURE — 99205 OFFICE O/P NEW HI 60 MIN: CPT | Performed by: INTERNAL MEDICINE

## 2020-10-05 PROCEDURE — 1101F PT FALLS ASSESS-DOCD LE1/YR: CPT | Performed by: INTERNAL MEDICINE

## 2020-10-05 PROCEDURE — G8752 SYS BP LESS 140: HCPCS | Performed by: INTERNAL MEDICINE

## 2020-10-05 PROCEDURE — 3017F COLORECTAL CA SCREEN DOC REV: CPT | Performed by: INTERNAL MEDICINE

## 2020-10-05 PROCEDURE — G0444 DEPRESSION SCREEN ANNUAL: HCPCS | Performed by: INTERNAL MEDICINE

## 2020-10-05 PROCEDURE — G8754 DIAS BP LESS 90: HCPCS | Performed by: INTERNAL MEDICINE

## 2020-10-05 RX ORDER — POTASSIUM CHLORIDE 750 MG/1
10 CAPSULE, EXTENDED RELEASE ORAL DAILY
COMMUNITY

## 2020-10-05 RX ORDER — PANTOPRAZOLE SODIUM 20 MG/1
20 TABLET, DELAYED RELEASE ORAL DAILY
COMMUNITY

## 2020-10-05 RX ORDER — FLECAINIDE ACETATE 50 MG/1
50 TABLET ORAL 2 TIMES DAILY
Qty: 60 TAB | Refills: 3 | Status: SHIPPED | OUTPATIENT
Start: 2020-10-05

## 2020-10-05 NOTE — PROGRESS NOTES
Faxed office note and EKG from appointment with Dr. Dede Davidson today with request to have patient scheduled for DCCV with Dr. Amrik Ng per Dr. Fabio Iqbal.

## 2020-10-05 NOTE — PROGRESS NOTES
Room 3    Atrial Fib    Discuss hctz    Manual pulse-irreg, hard to count    See's Dr. Jackie Rothman    Have you ever seen a electrophysiologist: No    Chest pain: no  Shortness of breath: no  Edema: no  Palpitations, Skipped beats, Rapid heartbeat: no  Dizziness: no    (Recent) New diagnosis/Surgeries: no    ER/Hospitalizations for your symptoms: 9- for afib episode

## 2020-10-06 ENCOUNTER — DOCUMENTATION ONLY (OUTPATIENT)
Dept: CARDIOLOGY CLINIC | Age: 75
End: 2020-10-06

## 2020-10-07 ENCOUNTER — TELEPHONE (OUTPATIENT)
Dept: CARDIOLOGY CLINIC | Age: 75
End: 2020-10-07

## 2020-10-07 NOTE — TELEPHONE ENCOUNTER
Pt states she supposed to have a procedure tomorrow for a heart Shock; pt states her heart is already back in shock.  Please advise        Phone: [phone] leave message

## 2020-10-07 NOTE — TELEPHONE ENCOUNTER
Called patient, ID verified using two patient identifiers. Patient states she is no longer in Afib she states she converted yesterday and that she will no longer need the DCCV scheduled with Dr. Helder Monte office. She states he rBP yesterday was 118/79 and HR was 71. She will contact Dr. Helder Monte office to notify them to cancel DCCV and she will call to schedule a 2 week follow up with Dr. Richard Grant as per his last office note.

## 2020-10-21 ENCOUNTER — TELEPHONE (OUTPATIENT)
Dept: CARDIOLOGY CLINIC | Age: 75
End: 2020-10-21

## 2020-10-21 NOTE — TELEPHONE ENCOUNTER
Patient is calling to discuss how she is feeling after starting medications. Would like to know if she needs to schedule a follow up.      Phone; [phone] or [phone]

## 2020-10-21 NOTE — TELEPHONE ENCOUNTER
Called patient, ID verified using two patient identifiers. Patient states she took Flecainide for 4-5 days and then stopped it due to nausea. Scheduled patient for follow up with Dr. Paula Zapata on Wednesday 10/28/20 @ 11:20 am to discuss medication and treatment options.      Future Appointments   Date Time Provider Harsha Orourke   10/28/2020 11:20 AM Tavon Zarate MD [facility]

## 2020-10-28 ENCOUNTER — OFFICE VISIT (OUTPATIENT)
Dept: CARDIOLOGY CLINIC | Age: 75
End: 2020-10-28
Payer: COMMERCIAL

## 2020-10-28 VITALS
WEIGHT: 224 LBS | SYSTOLIC BLOOD PRESSURE: 122 MMHG | HEART RATE: 64 BPM | BODY MASS INDEX: 36 KG/M2 | OXYGEN SATURATION: 99 % | HEIGHT: 66 IN | DIASTOLIC BLOOD PRESSURE: 84 MMHG

## 2020-10-28 DIAGNOSIS — I48.0 PAROXYSMAL A-FIB (HCC): Primary | ICD-10-CM

## 2020-10-28 DIAGNOSIS — I10 ESSENTIAL HYPERTENSION: ICD-10-CM

## 2020-10-28 PROCEDURE — 1101F PT FALLS ASSESS-DOCD LE1/YR: CPT | Performed by: INTERNAL MEDICINE

## 2020-10-28 PROCEDURE — 1090F PRES/ABSN URINE INCON ASSESS: CPT | Performed by: INTERNAL MEDICINE

## 2020-10-28 PROCEDURE — G8510 SCR DEP NEG, NO PLAN REQD: HCPCS | Performed by: INTERNAL MEDICINE

## 2020-10-28 PROCEDURE — G8752 SYS BP LESS 140: HCPCS | Performed by: INTERNAL MEDICINE

## 2020-10-28 PROCEDURE — G8427 DOCREV CUR MEDS BY ELIG CLIN: HCPCS | Performed by: INTERNAL MEDICINE

## 2020-10-28 PROCEDURE — G8400 PT W/DXA NO RESULTS DOC: HCPCS | Performed by: INTERNAL MEDICINE

## 2020-10-28 PROCEDURE — 3017F COLORECTAL CA SCREEN DOC REV: CPT | Performed by: INTERNAL MEDICINE

## 2020-10-28 PROCEDURE — G8754 DIAS BP LESS 90: HCPCS | Performed by: INTERNAL MEDICINE

## 2020-10-28 PROCEDURE — 99215 OFFICE O/P EST HI 40 MIN: CPT | Performed by: INTERNAL MEDICINE

## 2020-10-28 PROCEDURE — G8536 NO DOC ELDER MAL SCRN: HCPCS | Performed by: INTERNAL MEDICINE

## 2020-10-28 PROCEDURE — G8417 CALC BMI ABV UP PARAM F/U: HCPCS | Performed by: INTERNAL MEDICINE

## 2020-10-28 NOTE — PROGRESS NOTES
HISTORY OF PRESENTING ILLNESS      Lizzie Mckeon is a 76 y.o. female recently seen in the [facility] ER for AF with RVR. Her metoprolol was increased. Hospitalized in 2008 with AF. Had recurrent AF recently that prompted visit to ER where her metoprolol dosing was increased to 50 mg. Had been on flecainide (PIP) in the past however self discontinued this earlier this year. Has been on anticoagulation for at least 1 month. EKG shows AF with RBBB today. She has noticed palpitations and SOB. She has gastric issues and is uncertain whether this could be driving her current symptoms vs the AF. Last visit we restarted her  flecainide; continue metoprolol/xarelto. She was planned for DCCV however presented in SR. She believes she engendered minimal change in symptoms. PAST MEDICAL HISTORY     Past Medical History:   Diagnosis Date    ADD (attention deficit disorder with hyperactivity)     Chronic pain     GERD (gastroesophageal reflux disease)     Hypertension     Hypothyroid     Ill-defined condition     fibromyalgia    Insomnia     Obesity     PAF (paroxysmal atrial fibrillation) ([address]     initially in 2008, with more spells since then (last [facility] 7/13 at [facility])           PAST SURGICAL HISTORY     Past Surgical History:   Procedure Laterality Date    CARDIAC CATHETERIZATION  5/08    normal    COLONOSCOPY N/A 7/6/2018    COLONOSCOPY performed by Emma Samayoa MD at [address] [facility] ECHO 2D ADULT  5/08    normal    HX APPENDECTOMY      HX ORTHOPAEDIC Bilateral 2006, 2005    rotator cuff    HX ORTHOPAEDIC Bilateral 2008, 2008    knee replacement    HX OTHER SURGICAL      Echo 4/6/12 - normal, LVEF 60-65%           ALLERGIES     Allergies   Allergen Reactions    Sulfa (Sulfonamide Antibiotics) Other (comments)     Reports headaches.           FAMILY HISTORY     Family History   Problem Relation Age of Onset    Cancer Mother     Cancer Father     Breast Cancer Sister negative for cardiac disease       SOCIAL HISTORY     Social History     Socioeconomic History    Marital status:      Spouse name: Not on file    Number of children: Not on file    Years of education: Not on file    Highest education level: Not on file   Tobacco Use    Smoking status: Former Smoker     Years: 10.00     Last attempt to quit:      Years since quittin.8    Smokeless tobacco: Never Used   Substance and Sexual Activity    Alcohol use: Yes     Alcohol/week: 1.0 standard drinks     Types: 1 Glasses of wine per week     Comment: rare     Drug use: No         MEDICATIONS     Current Outpatient Medications   Medication Sig    potassium chloride SA (MICRO-K) 10 mEq capsule Take 10 mEq by mouth daily.  pantoprazole (PROTONIX) 20 mg tablet Take 20 mg by mouth daily.  metoprolol tartrate (LOPRESSOR) 50 mg tablet Take 1 Tab by mouth two (2) times a day for 30 days.  OTHER 1 Applicator daily. Uses CBD oil once a day.  multivitamin (ONE A DAY) tablet Take 1 Tab by mouth daily.  cholecalciferol (VITAMIN D3) 1,000 unit tablet Take 3,000 Units by mouth daily.  cyanocobalamin (VITAMIN B-12) 1,000 mcg tablet Take 1,000 mcg by mouth daily.  DULoxetine (CYMBALTA) 20 mg capsule Take 20 mg by mouth daily.  hydrochlorothiazide (HYDRODIURIL) 25 mg tablet Take 25 mg by mouth two (2) times a day.  XARELTO 20 mg tab tablet TAKE 1 TABLET BY MOUTH DAILY    levothyroxine (SYNTHROID) 25 mcg tablet Take 50 mcg by mouth Daily (before breakfast).  flecainide (TAMBOCOR) 50 mg tablet Take 1 Tab by mouth two (2) times a day. No current facility-administered medications for this visit. I have reviewed the nurses notes, vitals, problem list, allergy list, medical history, family, social history and medications. REVIEW OF SYMPTOMS      General: Pt denies excessive weight gain or loss.  Pt is able to conduct ADL's  HEENT: Denies blurred vision, headaches, hearing loss, epistaxis and difficulty swallowing. Respiratory: Denies cough, congestion, shortness of breath, ENRIQUEZ, wheezing or stridor. Cardiovascular: Denies precordial pain, palpitations, edema or PND  Gastrointestinal: Denies poor appetite, indigestion, abdominal pain or blood in stool  Genitourinary: Denies hematuria, dysuria, increased urinary frequency  Musculoskeletal: Denies joint pain or swelling from muscles or joints  Neurologic: Denies tremor, paresthesias, headache, or sensory motor disturbance  Psychiatric: Denies confusion, insomnia, depression  Integumentray: Denies rash, itching or ulcers. Hematologic: Denies easy bruising, bleeding       PHYSICAL EXAMINATION      Vitals: see vitals section  General: Well developed, in no acute distress. HEENT: No jaundice, oral mucosa moist, no oral ulcers  Neck: Supple, no stiffness, no lymphadenopathy, supple  Heart:  Normal S1/S2 negative S3 or S4. Regular, no murmur, gallop or rub, no jugular venous distention  Respiratory: Clear bilaterally x 4, no wheezing or rales  Abdomen:   Soft, non-tender, bowel sounds are active. Extremities:  No edema, normal cap refill, no cyanosis. Musculoskeletal: No clubbing, no deformities  Neuro: A&Ox3, speech clear, gait stable, cooperative, no focal neurologic deficits  Skin: Skin color is normal. No rashes or lesions.  Non diaphoretic, moist.  Vascular: 2+ pulses symmetric in all extremities       DIAGNOSTIC DATA      EKG:        LABORATORY DATA      Lab Results   Component Value Date/Time    WBC 9.4 09/29/2020 04:49 PM    Hemoglobin (POC) 13.3 07/01/2013 07:08 PM    HGB 16.2 (H) 09/29/2020 04:49 PM    Hematocrit (POC) 39 07/01/2013 07:08 PM    HCT 48.0 (H) 09/29/2020 04:49 PM    PLATELET 331 77/84/2443 04:49 PM    MCV 92.0 09/29/2020 04:49 PM      Lab Results   Component Value Date/Time    Sodium 139 09/29/2020 04:49 PM    Potassium 3.4 (L) 09/29/2020 04:49 PM    Chloride 103 09/29/2020 04:49 PM    CO2 29 09/29/2020 04:49 PM Anion gap 7 09/29/2020 04:49 PM    Glucose 100 09/29/2020 04:49 PM    BUN 29 (H) 09/29/2020 04:49 PM    Creatinine 1.41 (H) 09/29/2020 04:49 PM    BUN/Creatinine ratio 21 (H) 09/29/2020 04:49 PM    GFR est AA 44 (L) 09/29/2020 04:49 PM    GFR est non-AA 36 (L) 09/29/2020 04:49 PM    Calcium 9.0 09/29/2020 04:49 PM    Bilirubin, total 0.4 07/01/2013 07:01 PM    Alk. phosphatase 112 07/01/2013 07:01 PM    Protein, total 7.4 07/01/2013 07:01 PM    Albumin 3.6 07/01/2013 07:01 PM    Globulin 3.8 07/01/2013 07:01 PM    A-G Ratio 0.9 (L) 07/01/2013 07:01 PM    ALT (SGPT) 39 07/01/2013 07:01 PM           ASSESSMENT      1. Atrial fibrillation   A. Minimally (if at all) symptomatic  2. Hypertension  3. Obesity        PLAN     Continue with PIP flecainide. ICD-10-CM ICD-9-CM    1. Paroxysmal A-fib (HCC)  I48.0 427.31    2. Essential hypertension  I10 401.9      No orders of the defined types were placed in this encounter. FOLLOW-UP     1 year      Thank you, Karina Pulido MD for allowing me to participate in the care of this extraordinarily pleasant female. Please do not hesitate to contact me for further questions/concerns.          Kimber Pena MD  Cardiac Electrophysiology / Cardiology    [place] [id number].  [address], [address] [facility], [address] [place] [address], [address] [place] [address]  [phone] / [phone] Fax   [phone] / [phone] Fax

## 2020-10-28 NOTE — PROGRESS NOTES
Room 5    Visit Vitals  /84 (BP 1 Location: Left arm, BP Patient Position: Sitting)   Pulse 64   Ht 5' 6\" (1.676 m)   Wt 224 lb (101.6 kg)   SpO2 99%   BMI 36.15 kg/m²       Stopped flecanide on her own. .. took it for 1 week \"felt it was to much\"    Chest pain: no  Shortness of breath: no  Edema: no  Palpitations, Skipped beats, Rapid heartbeat: no  Dizziness: no    New diagnosis/Surgeries: no    ER/Hospitalizations: no    Refills: no

## 2021-03-08 ENCOUNTER — HOSPITAL ENCOUNTER (EMERGENCY)
Age: 76
Discharge: HOME | End: 2021-03-09
Attending: EMERGENCY MEDICINE
Payer: COMMERCIAL

## 2021-03-08 DIAGNOSIS — I48.91 ATRIAL FIBRILLATION, UNSPECIFIED TYPE (HCC): Primary | ICD-10-CM

## 2021-03-08 LAB
BASOPHILS # BLD: 0.1 K/UL (ref 0–0.1)
BASOPHILS NFR BLD: 1 % (ref 0–1)
COMMENT, HOLDF: NORMAL
DIFFERENTIAL METHOD BLD: NORMAL
EOSINOPHIL # BLD: 0.2 K/UL (ref 0–0.4)
EOSINOPHIL NFR BLD: 2 % (ref 0–7)
ERYTHROCYTE [DISTWIDTH] IN BLOOD BY AUTOMATED COUNT: 13.1 % (ref 11.5–14.5)
HCT VFR BLD AUTO: 42.7 % (ref 35–47)
HGB BLD-MCNC: 14.9 G/DL (ref 11.5–16)
IMM GRANULOCYTES # BLD AUTO: 0 K/UL (ref 0–0.04)
IMM GRANULOCYTES NFR BLD AUTO: 0 % (ref 0–0.5)
LYMPHOCYTES # BLD: 2.3 K/UL (ref 0.8–3.5)
LYMPHOCYTES NFR BLD: 29 % (ref 12–49)
MCH RBC QN AUTO: 31.2 PG (ref 26–34)
MCHC RBC AUTO-ENTMCNC: 34.9 G/DL (ref 30–36.5)
MCV RBC AUTO: 89.5 FL (ref 80–99)
MONOCYTES # BLD: 0.7 K/UL (ref 0–1)
MONOCYTES NFR BLD: 8 % (ref 5–13)
NEUTS SEG # BLD: 4.7 K/UL (ref 1.8–8)
NEUTS SEG NFR BLD: 60 % (ref 32–75)
NRBC # BLD: 0 K/UL (ref 0–0.01)
NRBC BLD-RTO: 0 PER 100 WBC
PLATELET # BLD AUTO: 254 K/UL (ref 150–400)
PMV BLD AUTO: 10.8 FL (ref 8.9–12.9)
RBC # BLD AUTO: 4.77 M/UL (ref 3.8–5.2)
SAMPLES BEING HELD,HOLD: NORMAL
WBC # BLD AUTO: 7.9 K/UL (ref 3.6–11)

## 2021-03-08 PROCEDURE — 84484 ASSAY OF TROPONIN QUANT: CPT

## 2021-03-08 PROCEDURE — 85025 COMPLETE CBC W/AUTO DIFF WBC: CPT

## 2021-03-08 PROCEDURE — 36415 COLL VENOUS BLD VENIPUNCTURE: CPT

## 2021-03-08 PROCEDURE — 83690 ASSAY OF LIPASE: CPT

## 2021-03-08 PROCEDURE — 84443 ASSAY THYROID STIM HORMONE: CPT

## 2021-03-08 PROCEDURE — 80053 COMPREHEN METABOLIC PANEL: CPT

## 2021-03-08 PROCEDURE — 99285 EMERGENCY DEPT VISIT HI MDM: CPT

## 2021-03-08 PROCEDURE — 83735 ASSAY OF MAGNESIUM: CPT

## 2021-03-08 PROCEDURE — 84439 ASSAY OF FREE THYROXINE: CPT

## 2021-03-08 PROCEDURE — 93005 ELECTROCARDIOGRAM TRACING: CPT

## 2021-03-08 PROCEDURE — 83880 ASSAY OF NATRIURETIC PEPTIDE: CPT

## 2021-03-09 VITALS
BODY MASS INDEX: 35.36 KG/M2 | WEIGHT: 220 LBS | SYSTOLIC BLOOD PRESSURE: 118 MMHG | OXYGEN SATURATION: 95 % | DIASTOLIC BLOOD PRESSURE: 87 MMHG | HEIGHT: 66 IN | TEMPERATURE: 97.1 F | HEART RATE: 91 BPM | RESPIRATION RATE: 21 BRPM

## 2021-03-09 LAB
ALBUMIN SERPL-MCNC: 3.5 G/DL (ref 3.5–5)
ALBUMIN/GLOB SERPL: 0.9 {RATIO} (ref 1.1–2.2)
ALP SERPL-CCNC: 88 U/L (ref 45–117)
ALT SERPL-CCNC: 29 U/L (ref 12–78)
ANION GAP SERPL CALC-SCNC: 8 MMOL/L (ref 5–15)
AST SERPL-CCNC: 32 U/L (ref 15–37)
ATRIAL RATE: 120 BPM
BILIRUB SERPL-MCNC: 0.4 MG/DL (ref 0.2–1)
BNP SERPL-MCNC: 111 PG/ML
BUN SERPL-MCNC: 32 MG/DL (ref 6–20)
BUN/CREAT SERPL: 25 (ref 12–20)
CALCIUM SERPL-MCNC: 9.2 MG/DL (ref 8.5–10.1)
CALCULATED R AXIS, ECG10: -4 DEGREES
CALCULATED T AXIS, ECG11: 9 DEGREES
CHLORIDE SERPL-SCNC: 101 MMOL/L (ref 97–108)
CO2 SERPL-SCNC: 28 MMOL/L (ref 21–32)
CREAT SERPL-MCNC: 1.26 MG/DL (ref 0.55–1.02)
DIAGNOSIS, 93000: NORMAL
GLOBULIN SER CALC-MCNC: 4.1 G/DL (ref 2–4)
GLUCOSE SERPL-MCNC: 119 MG/DL (ref 65–100)
LIPASE SERPL-CCNC: 278 U/L (ref 73–393)
MAGNESIUM SERPL-MCNC: 2.4 MG/DL (ref 1.6–2.4)
POTASSIUM SERPL-SCNC: 3.6 MMOL/L (ref 3.5–5.1)
PROT SERPL-MCNC: 7.6 G/DL (ref 6.4–8.2)
Q-T INTERVAL, ECG07: 416 MS
QRS DURATION, ECG06: 160 MS
QTC CALCULATION (BEZET), ECG08: 525 MS
SODIUM SERPL-SCNC: 137 MMOL/L (ref 136–145)
T4 FREE SERPL-MCNC: 1.2 NG/DL (ref 0.8–1.5)
TROPONIN I SERPL-MCNC: <0.05 NG/ML
TSH SERPL DL<=0.05 MIU/L-ACNC: 2.66 UIU/ML (ref 0.36–3.74)
VENTRICULAR RATE, ECG03: 96 BPM

## 2021-03-09 NOTE — ED TRIAGE NOTES
Patient presents to ED for afib that began around 2130 this evening. Does have hx of afib which she takes flecanide for. Reports back stiffness and chest tightness. Denies SOB.

## 2021-03-09 NOTE — DISCHARGE INSTRUCTIONS
Thank you for allowing us to provide you with medical care today. We realize that you have many choices for your emergency care needs. We thank you for choosing [facility]. Please choose us in the future for any continued health care needs. We hope we addressed all of your medical concerns. We strive to provide excellent quality care in the Emergency Department. Anything less than excellent does not meet our expectations. The exam and treatment you received in the Emergency Department were for an emergent problem and are not intended as complete care. It is important that you follow up with a doctor, nurse practitioner, or [address] [place] [address] assistant for ongoing care. If your symptoms worsen or you do not improve as expected and you are unable to reach your usual health care provider, you should return to the Emergency Department. We are available 24 hours a day. Take this sheet with you when you go to your follow-up visit. If you have any problem arranging the follow-up visit, contact the Emergency Department immediately. Make an appointment your family doctor for follow up of this visit. Return to the ER if you are unable to be seen in a timely manner.

## 2021-03-09 NOTE — ED PROVIDER NOTES
Please note that this dictation was completed with [employer], the computer voice recognition software.  Quite often unanticipated grammatical, syntax, homophones, and other interpretive errors are inadvertently transcribed by the computer software.  Please disregard these errors.  Please excuse any errors that have escaped final proofreading. 63-year-old female past medical history remarkable for ADD, chronic pain, GERD, hypertension, hypothyroidism, fibromyalgia, insomnia, obesity, and proximal A. fib presents the ER complaining of \" earlier this evening (4-5 hours ago)was sitting there and felt my heart \"turning over\". States she has been experiencing some \"chest tightness\" but no shortness of breath overt chest pain. I can just feel it beating hard in my chest.\"  Patient states she is on Xarelto she has proximal A. fib episodes typically sees Dr. Jarrod Pineda and Dr. Win Corral.     pt denies HA, vison changes, diff swallowing, current CP, SOB, Abd pain, F/Ch, N/V, D/Cons or other current systemic complaints    Social/ PSH reviewed in EMR    EMR Chart Reviewed           Past Medical History:   Diagnosis Date    ADD (attention deficit disorder with hyperactivity)     Chronic pain     GERD (gastroesophageal reflux disease)     Hypertension     Hypothyroid     Ill-defined condition     fibromyalgia    Insomnia     Obesity     PAF (paroxysmal atrial fibrillation) ([address]     initially in 2008, with more spells since then (last documented 7/13 at [facility])       Past Surgical History:   Procedure Laterality Date    CARDIAC CATHETERIZATION  5/08    normal    COLONOSCOPY N/A 7/6/2018    COLONOSCOPY performed by Neida Weinstein MD at [address] ECHO 2D ADULT  5/08    normal    HX APPENDECTOMY      HX ORTHOPAEDIC Bilateral 2006, 2005    rotator cuff    HX ORTHOPAEDIC Bilateral 2008, 2008    knee replacement    HX OTHER SURGICAL      Echo 4/6/12 - normal, LVEF 60-65%          Family History:   Problem Relation Age of Onset    Cancer Mother     Cancer Father     Breast Cancer Sister        Social History     Socioeconomic History    Marital status:      Spouse name: Not on file    Number of children: Not on file    Years of education: Not on file    Highest education level: Not on file   Occupational History    Not on file   Social Needs    Financial resource strain: Not on file    Food insecurity     Worry: Not on file     Inability: Not on file    Transportation needs     Medical: Not on file     Non-medical: Not on file   Tobacco Use    Smoking status: Former Smoker     Years: 10.00     Quit date:      Years since quittin.2    Smokeless tobacco: Never Used   Substance and Sexual Activity    Alcohol use: Yes     Alcohol/week: 1.0 standard drinks     Types: 1 Glasses of wine per week     Comment: rare     Drug use: No    Sexual activity: Not on file   Lifestyle    Physical activity     Days per week: Not on file     Minutes per session: Not on file    Stress: Not on file   Relationships    Social connections     Talks on phone: Not on file     Gets together: Not on file     Attends Latter day service: Not on file     Active member of club or organization: Not on file     Attends meetings of clubs or organizations: Not on file     Relationship status: Not on file    Intimate partner violence     Fear of current or ex partner: Not on file     Emotionally abused: Not on file     Physically abused: Not on file     Forced sexual activity: Not on file   Other Topics Concern    Not on file   Social History Narrative    Not on file         ALLERGIES: Sulfa (sulfonamide antibiotics)    Review of Systems   Constitutional: Negative for chills and fever. HENT: Negative for trouble swallowing and voice change. Eyes: Negative for photophobia and visual disturbance. Respiratory: Positive for chest tightness. Negative for cough, choking, shortness of breath and stridor.     Cardiovascular: Positive for palpitations. Negative for chest pain and leg swelling. Gastrointestinal: Negative for abdominal pain, constipation, diarrhea, nausea, rectal pain and vomiting. Genitourinary: Negative for dysuria. Musculoskeletal: Negative for back pain. Neurological: Negative for facial asymmetry and speech difficulty. All other systems reviewed and are negative. Vitals:    03/09/21 0114 03/09/21 0115 03/09/21 0130 03/09/21 0135   BP:    118/87   Pulse: 93 91 91 91   Resp: 15 18 18 21   Temp:       SpO2: 96% 93% 95% 95%   Weight:       Height:                Physical Exam  Vitals signs and nursing note reviewed. Constitutional:       General: She is not in acute distress. Appearance: Normal appearance. She is well-developed. She is obese. She is not ill-appearing, toxic-appearing or diaphoretic. Comments: NAD, AxOx4, speaking in complete sentences       HENT:      Head: Normocephalic and atraumatic. Comments: Cn intact         Right Ear: External ear normal.      Left Ear: External ear normal.   Eyes:      General: No scleral icterus. Right eye: No discharge. Left eye: No discharge. Extraocular Movements: Extraocular movements intact. Conjunctiva/sclera: Conjunctivae normal.      Pupils: Pupils are equal, round, and reactive to light. Neck:      Musculoskeletal: Normal range of motion and neck supple. No muscular tenderness. Vascular: No JVD. Trachea: No tracheal deviation. Cardiovascular:      Rate and Rhythm: Normal rate. Rhythm irregular. Pulses: Normal pulses. Heart sounds: Normal heart sounds. No murmur. No friction rub. No gallop. Pulmonary:      Effort: Pulmonary effort is normal. No respiratory distress. Breath sounds: Normal breath sounds. No wheezing or rales. Chest:      Chest wall: No tenderness. Abdominal:      General: Bowel sounds are normal.      Palpations: Abdomen is soft. Tenderness:  There is no abdominal tenderness. There is no guarding or rebound. Genitourinary:     Vagina: No vaginal discharge. Comments: Pt denies urinary/ vaginal complaints  Musculoskeletal: Normal range of motion. General: No swelling, tenderness, deformity or signs of injury. Right lower leg: No edema. Left lower leg: No edema. Skin:     General: Skin is warm and dry. Capillary Refill: Capillary refill takes less than 2 seconds. Coloration: Skin is not pale. Findings: No bruising, erythema, lesion or rash. Neurological:      General: No focal deficit present. Mental Status: She is alert and oriented to person, place, and time. Cranial Nerves: No cranial nerve deficit. Sensory: No sensory deficit. Motor: No weakness or abnormal muscle tone. Coordination: Coordination normal.      Gait: Gait normal.      Deep Tendon Reflexes: Reflexes normal.      Comments: pt has motor/ CV/ Sensation grossly intact to all extremities, R = L in strength;   Psychiatric:         Behavior: Behavior normal.         Thought Content: Thought content normal.          MDM       Procedures      No chief complaint on file. 10:47 PM  The patients presenting problems have been discussed, and they are in agreement with the care plan formulated and outlined with them. I have encouraged them to ask questions as they arise throughout their visit. MEDICATIONS GIVEN:  Medications - No data to display    LABS REVIEWED:  Labs Reviewed - No data to display    RADIOLOGY RESULTS:  The following have been ordered and reviewed:  _____________________________________________________________________  _____________________________________________________________________    EKG interpretation:   Rhythm: a fib rhythm; and ir-regular .  Rate (approx.): 96; Axis: normal; P wave: absent; QRS interval: normal ; ST/T wave: normal; Negative acute significant segmental elevations/ compared to study dated 09/29/2020    PROCEDURES:        CONSULTATIONS:       PROGRESS NOTES:      DIAGNOSIS:    1. Atrial fibrillation, unspecified type [facility])        PLAN:  1-d/c home/ she will take her flecanide/ see Dr Vidhi Waggoner as discussed       ED COURSE: The patients hospital course has been uncomplicated. 1:29 AM  'Im ready to go home now. Usually when this happens I just take a 2nd flecanide and Im fine'; Asked to follow-up with Dr Vidhi Waggoner. Pt is medically capable of making an informed decision, was made aware of the risks of leaving AMA. Pt allowed to leave ama. Slick Granda  results have been reviewed with her. She has been counseled regarding her diagnosis. She verbally conveys understanding and agreement of the signs, symptoms, diagnosis, treatment and prognosis and additionally agrees to Call/ Arrange follow up as recommended with Dr. Jeramy Ayala MD in 24 - 48 hours. She also agrees with the care-plan and conveys that all of her questions have been answered. I have also put together some discharge instructions for her that include: 1) educational information regarding their diagnosis, 2) how to care for their diagnosis at home, as well a 3) list of reasons why they would want to return to the ED prior to their follow-up appointment, should their condition change or for concerns.

## 2021-06-15 ENCOUNTER — OFFICE VISIT (OUTPATIENT)
Dept: FAMILY MEDICINE CLINIC | Age: 76
End: 2021-06-15
Payer: COMMERCIAL

## 2021-06-15 VITALS
WEIGHT: 227 LBS | SYSTOLIC BLOOD PRESSURE: 126 MMHG | RESPIRATION RATE: 18 BRPM | HEIGHT: 66 IN | TEMPERATURE: 97.4 F | OXYGEN SATURATION: 96 % | DIASTOLIC BLOOD PRESSURE: 72 MMHG | HEART RATE: 59 BPM | BODY MASS INDEX: 36.48 KG/M2

## 2021-06-15 DIAGNOSIS — E66.01 SEVERE OBESITY (HCC): ICD-10-CM

## 2021-06-15 DIAGNOSIS — K13.70 MOUTH LESION: ICD-10-CM

## 2021-06-15 DIAGNOSIS — M79.7 FIBROMYALGIA: ICD-10-CM

## 2021-06-15 DIAGNOSIS — E78.2 MIXED HYPERLIPIDEMIA: ICD-10-CM

## 2021-06-15 DIAGNOSIS — G89.29 CHRONIC GENERALIZED PAIN: Primary | ICD-10-CM

## 2021-06-15 DIAGNOSIS — E55.9 VITAMIN D DEFICIENCY: ICD-10-CM

## 2021-06-15 DIAGNOSIS — Z13.220 SCREENING FOR HYPERLIPIDEMIA: ICD-10-CM

## 2021-06-15 DIAGNOSIS — R52 CHRONIC GENERALIZED PAIN: Primary | ICD-10-CM

## 2021-06-15 DIAGNOSIS — R53.83 MALAISE AND FATIGUE: ICD-10-CM

## 2021-06-15 DIAGNOSIS — R53.81 MALAISE AND FATIGUE: ICD-10-CM

## 2021-06-15 PROBLEM — Z80.3 FAMILY HISTORY OF BREAST CANCER: Status: ACTIVE | Noted: 2017-03-15

## 2021-06-15 PROCEDURE — 1101F PT FALLS ASSESS-DOCD LE1/YR: CPT | Performed by: INTERNAL MEDICINE

## 2021-06-15 PROCEDURE — G8536 NO DOC ELDER MAL SCRN: HCPCS | Performed by: INTERNAL MEDICINE

## 2021-06-15 PROCEDURE — G8400 PT W/DXA NO RESULTS DOC: HCPCS | Performed by: INTERNAL MEDICINE

## 2021-06-15 PROCEDURE — G8752 SYS BP LESS 140: HCPCS | Performed by: INTERNAL MEDICINE

## 2021-06-15 PROCEDURE — G8427 DOCREV CUR MEDS BY ELIG CLIN: HCPCS | Performed by: INTERNAL MEDICINE

## 2021-06-15 PROCEDURE — 1090F PRES/ABSN URINE INCON ASSESS: CPT | Performed by: INTERNAL MEDICINE

## 2021-06-15 PROCEDURE — G8754 DIAS BP LESS 90: HCPCS | Performed by: INTERNAL MEDICINE

## 2021-06-15 PROCEDURE — 99203 OFFICE O/P NEW LOW 30 MIN: CPT | Performed by: INTERNAL MEDICINE

## 2021-06-15 PROCEDURE — G8510 SCR DEP NEG, NO PLAN REQD: HCPCS | Performed by: INTERNAL MEDICINE

## 2021-06-15 PROCEDURE — G8417 CALC BMI ABV UP PARAM F/U: HCPCS | Performed by: INTERNAL MEDICINE

## 2021-06-15 RX ORDER — LEVOTHYROXINE SODIUM 50 UG/1
TABLET ORAL
COMMUNITY
Start: 2021-04-28

## 2021-06-15 RX ORDER — DULOXETIN HYDROCHLORIDE 30 MG/1
CAPSULE, DELAYED RELEASE ORAL
COMMUNITY
Start: 2021-04-28

## 2021-06-15 RX ORDER — TRIAMTERENE AND HYDROCHLOROTHIAZIDE 37.5; 25 MG/1; MG/1
CAPSULE ORAL
COMMUNITY
Start: 2021-05-06

## 2021-06-15 RX ORDER — METOPROLOL TARTRATE 50 MG/1
TABLET ORAL
COMMUNITY
Start: 2021-04-07

## 2021-06-15 NOTE — ASSESSMENT & PLAN NOTE
I have reviewed/discussed the above normal BMI with the patient. I have recommended the following interventions: dietary management education, guidance, and counseling, encourage exercise, and monitor weight . Nelsy Prim

## 2021-06-15 NOTE — PATIENT INSTRUCTIONS
Fibromyalgia. Fibromyalgia is a disease characterized by chronic widespread musculoskeletal pain. Fibromyalgia is caused by abnormal processing of pain signals in the central nervous system, leading to exaggerated pain responses. Non-Pharmacologic Therapies: 
Non-pharmacologic therapies such as cardiovascular exercise and Cognitive Behavioral Therapy have been shown to be of benefit ([address], Am J Med 2009). Lencho Chi in particular has proven efficacy in the treatment of fibromyalgia ANGELICA Martini 2010).   
 
Pharmacotherapy: If pharmacotherapy is pursued, pregabalin (Lyrica), gabapentin (Neurontin), milnacipran (Jodie Graciela), and duloxetine (Cymbalta) are FDA approved medications for the treatment of fibromyalgia. Narcotics have not been proven to be efficacious in the treatment of fibromyalgia. In fact, narcotic use in this patient population has been observed to exacerbate depression, and may enhance the hyperalgesia which is characteristic of this condition (Glenn Delgado Rheum 2006). They also are at increased risk for opioid-induced hyperalgesia due predominantly to central sensitization Sallyann Fabry JT et al. [facility] Clin Rheumatol. 2013 Mar;19(2):72-7). Specifically, a double-blind placebo-controlled trial by Kelly Booker al published in 1995 demonstrated that intravenous morphine did not reduce pain in fibromyalgia patients. A study by Bradford Stephens al published in 2003 showed that fibromyalgia patients taking oral opiates did not experience improvement in their pain at four years of follow up, and also reported increased depression over the last two years of the study. There is subsequent concern that the prolonged use of narcotics to treat fibromyalgia may cause harm to these patients Eula Duarte, Pain 2005). Finally, opioid use in fibromyalgia had poorer symptoms and functional and occupational status compared to nonusers (Jeannie MARTE et al. Pain Res Treat. 7107;7251:223691).   We therefore recommend that narcotics be avoided in all patients with fibromyalgia. We recommend Lencho Chi stretching exercises for at least 30 minutes per day. The [employer] has made a videotape of Lencho Chi that She can borrow from [employer], purchase online or watch for free on [employer]. com Lencho Chi for Arthritis. Secondary causes: We discussed treating secondary causes, such as sleep apnea, poor sleep quality, depression, anxiety, weight loss, vitamin deficiencies, such as vitamin D, and pursuing aquatherapy.  I encouraged her to do [address].

## 2021-06-18 NOTE — PROGRESS NOTES
Chief Complaint   Patient presents with    New Patient     est care    Cyst     back of throat- painful if touched x1 month at least    Pain (Chronic)     help with inflamation    GERD     substitute to jeannine         Assessment/ Plan:   Diagnoses and all orders for this visit:    1. Chronic generalized pain  -     METABOLIC PANEL, COMPREHENSIVE; Future  -     ANTINUCLEAR ANTIBODIES, IFA; Future    2. Severe obesity ([address]  Assessment & Plan:  I have reviewed/discussed the above normal BMI with the patient. I have recommended the following interventions: dietary management education, guidance, and counseling, encourage exercise, and monitor weight . .      3. Fibromyalgia   Possible that this may be part of her pain syndrome. Gave information attached to her AVS.     4. Malaise and fatigue  -     CBC WITH AUTOMATED DIFF; Future  -     THYROID CASCADE PROFILE; Future    5. Vitamin D deficiency  -     VITAMIN D, 25 HYDROXY; Future    6. Screening for hyperlipidemia  -     METABOLIC PANEL, COMPREHENSIVE; Future    7. Mixed hyperlipidemia  -     LIPID PANEL; Future    8. Mouth lesion  -     REFERRAL TO ORAL MAXILLOFACIAL SURGERY    I have discussed the diagnosis with the patient and the intended treatment plan as seen in the above orders. The patient has received an after-visit summary and questions were answered concerning future plans. Asked to return should symptoms worsen or not improve with treatment. Any pending labs and studies will be relayed to patient when they become available. Pt verbalizes understanding of plan of care and denies further questions or concerns at this time. She also needs to have a comprehensive evaluation and will move towards that when her labs return. Follow-up and Dispositions    · Return if symptoms worsen or fail to improve. Subjective:   Ulisses Hernandez is a 68 y.o. female who presents as a new patient to [facility]..  She has a h/o A.fib, HTN and hypothyroid disease. She is here to establish care. However, specifically, she has a growth in the l-side of the mouth and buccal membrane that she noticed about 1-month ago. She has not been seen by her dentist. She reports that it is tender and painful to touch at times. No bleeding or discharge. Secondly, she has chronic body aches and pains and has joint inflammation. She has not really had a work up for this and is constantly uncomfortable. Lastly, she reports that she has GERD like symptoms that are helped with Beano. When she takes the Beano, her food digests rather well. She denies any CP or SOB. Patient Active Problem List    Diagnosis Date Noted    Severe [address] ([facility] [address] 10/05/2020    Family history of breast cancer 03/15/2017    Urinary tract infectious disease 07/10/2012    Obesity     PAF (paroxysmal atrial fibrillation) (HCC)     Hypertension     Uterine leiomyoma 08/17/2011    Abdominal bloating 08/15/2011    Family history of malignant neoplasm of ovary 08/15/2011         Current Outpatient Medications   Medication Sig Dispense Refill    levothyroxine (SYNTHROID) 50 mcg tablet       metoprolol tartrate (LOPRESSOR) 50 mg tablet       triamterene-hydroCHLOROthiazide (DYAZIDE) 37.5-25 mg per capsule       DULoxetine (CYMBALTA) 30 mg capsule       cannabidiol, CBD, (EPIDIOLEX PO) cannabidiol (CBD) extract      potassium chloride SA (MICRO-K) 10 mEq capsule Take 10 mEq by mouth daily.  pantoprazole (PROTONIX) 20 mg tablet Take 20 mg by mouth daily.  flecainide (TAMBOCOR) 50 mg tablet Take 1 Tab by mouth two (2) times a day. 60 Tab 3    multivitamin (ONE A DAY) tablet Take 1 Tab by mouth daily.  cholecalciferol (VITAMIN D3) 1,000 unit tablet Take 3,000 Units by mouth daily.  cyanocobalamin (VITAMIN B-12) 1,000 mcg tablet Take 1,000 mcg by mouth daily.       XARELTO 20 mg tab tablet TAKE 1 TABLET BY MOUTH DAILY 14 tablet 0         Allergies   Allergen Reactions    Sulfa (Sulfonamide Antibiotics) Other (comments)     Reports headaches. Past Medical History:   Diagnosis Date    ADD (attention deficit disorder with hyperactivity)     Chronic pain     GERD (gastroesophageal reflux disease)     Hypertension     Hypothyroid     Ill-defined condition     fibromyalgia    Insomnia     Obesity     PAF (paroxysmal atrial fibrillation) ([facility] [address]     initially in , with more spells since then (last documented  at [facility])         Past Surgical History:   Procedure Laterality Date    CARDIAC CATHETERIZATION      normal    COLONOSCOPY N/A 2018    COLONOSCOPY performed by Kimmy Garcia MD at [address] [facility] ECHO 2D ADULT      normal    HX APPENDECTOMY      HX ORTHOPAEDIC Bilateral ,     rotator cuff    HX ORTHOPAEDIC Bilateral ,     knee replacement    HX OTHER SURGICAL      Echo 12 - normal, LVEF 60-65%          Family History   Problem Relation Age of Onset    Cancer Mother         breast    Breast Cancer Mother     Cancer Father         prostate    Breast Cancer Sister     Heart Failure Brother     Other Maternal Grandmother         cerebal hym    Cancer Son         skin         Social History     Tobacco Use    Smoking status: Former Smoker     Years: 10.00     Quit date:      Years since quittin.4    Smokeless tobacco: Never Used   Substance Use Topics    Alcohol use: Yes     Alcohol/week: 1.0 standard drinks     Types: 1 Glasses of wine per week     Comment: rare         Review of Systems  Pertinent items are noted in HPI.        Objective:     Vitals:    06/15/21 1511   BP: 126/72   Pulse: (!) 59   Resp: 18   Temp: 97.4 °F (36.3 °C)   TempSrc: Oral   SpO2: 96%   Weight: 227 lb (103 kg)   Height: 5' 6\" (1.676 m)       General: alert, cooperative, no distress   Mental  status: normal mood, behavior, speech, dress, motor activity, and thought processes, able to follow commands   HENT: NCAT   Neck: no visualized mass   Resp: no respiratory distress   Neuro: no gross deficits   Skin: no discoloration or lesions of concern on visible areas   Psychiatric: normal affect, consistent with stated mood, no evidence of hallucinations     Silvana Reveles MD  Patient Instructions   Fibromyalgia. Fibromyalgia is a disease characterized by chronic widespread musculoskeletal pain. Fibromyalgia is caused by abnormal processing of pain signals in the central nervous system, leading to exaggerated pain responses. Non-Pharmacologic Therapies:  Non-pharmacologic therapies such as cardiovascular exercise and Cognitive Behavioral Therapy have been shown to be of benefit ([facility], Am J Med 2009). Lencho Chi in particular has proven efficacy in the treatment of fibromyalgia ANGELICA Gonzalez 2010).      Pharmacotherapy: If pharmacotherapy is pursued, pregabalin (Lyrica), gabapentin (Neurontin), milnacipran (Chester Click), and duloxetine (Cymbalta) are FDA approved medications for the treatment of fibromyalgia. Narcotics have not been proven to be efficacious in the treatment of fibromyalgia. In fact, narcotic use in this patient population has been observed to exacerbate depression, and may enhance the hyperalgesia which is characteristic of this condition ([facility] Rheum 2006). They also are at increased risk for opioid-induced hyperalgesia due predominantly to central sensitization Lamount Dakins JT et al. [facility] Clin Rheumatol. 2013 Mar;19(2):72-7). Specifically, a double-blind placebo-controlled trial by Saleem Greer al published in 1995 demonstrated that intravenous morphine did not reduce pain in fibromyalgia patients. A study by Mark Anthony Meeks al published in 2003 showed that fibromyalgia patients taking oral opiates did not experience improvement in their pain at four years of follow up, and also reported increased depression over the last two years of the study.  There is subsequent concern that the prolonged use of narcotics to treat fibromyalgia may cause harm to these patients Mazin Cedillo, Pain 2005). Finally, opioid use in fibromyalgia had poorer symptoms and functional and occupational status compared to nonusers (Jeannie MA et al. Pain Res Treat. 4595;5614:571817).  We therefore recommend that narcotics be avoided in all patients with fibromyalgia. We recommend Lencho Chi stretching exercises for at least 30 minutes per day. The [employer] has made a videotape of Lencho Chi that She can borrow from [employer], purchase online or watch for free on [email or web address]. com Lencho Chi for Arthritis. Secondary causes: We discussed treating secondary causes, such as sleep apnea, poor sleep quality, depression, anxiety, weight loss, vitamin deficiencies, such as vitamin D, and pursuing aquatherapy.  I encouraged her to do [address].

## 2022-03-18 PROBLEM — E66.01 SEVERE OBESITY (HCC): Status: ACTIVE | Noted: 2020-10-05

## 2022-03-18 PROBLEM — Z80.3 FAMILY HISTORY OF BREAST CANCER: Status: ACTIVE | Noted: 2017-03-15

## 2023-05-12 RX ORDER — POTASSIUM CHLORIDE 750 MG/1
CAPSULE, EXTENDED RELEASE ORAL DAILY
COMMUNITY

## 2023-05-12 RX ORDER — FLECAINIDE ACETATE 50 MG/1
TABLET ORAL 2 TIMES DAILY
COMMUNITY
Start: 2020-10-05

## 2023-05-12 RX ORDER — LEVOTHYROXINE SODIUM 0.05 MG/1
TABLET ORAL
COMMUNITY
Start: 2021-04-28

## 2023-05-12 RX ORDER — TRIAMTERENE AND HYDROCHLOROTHIAZIDE 37.5; 25 MG/1; MG/1
CAPSULE ORAL
COMMUNITY
Start: 2021-05-06

## 2023-05-12 RX ORDER — DULOXETIN HYDROCHLORIDE 30 MG/1
CAPSULE, DELAYED RELEASE ORAL
COMMUNITY
Start: 2021-04-28

## 2023-05-12 RX ORDER — PANTOPRAZOLE SODIUM 20 MG/1
20 TABLET, DELAYED RELEASE ORAL DAILY
COMMUNITY

## 2023-05-12 RX ORDER — METOPROLOL TARTRATE 50 MG/1
TABLET, FILM COATED ORAL
COMMUNITY
Start: 2021-04-07

## (undated) DEVICE — CANN NASAL O2 CAPNOGRAPHY AD -- FILTERLINE

## (undated) DEVICE — ADULT SPO2 SENSOR: Brand: NELLCOR

## (undated) DEVICE — KIT COLON W/ 1.1OZ LUB AND 2 END

## (undated) DEVICE — KENDALL RADIOLUCENT FOAM MONITORING ELECTRODE -RECTANGULAR SHAPE: Brand: KENDALL

## (undated) DEVICE — CONTAINER SPEC 20 ML LID NEUT BUFF FORMALIN 10 % POLYPR STS

## (undated) DEVICE — SET GRAV CK VLV NEEDLESS ST 3 GANGED 4WAY STPCOCK HI FLO 10

## (undated) DEVICE — 3M™ CUROS™ DISINFECTING CAP FOR NEEDLELESS CONNECTORS 270/CARTON 20 CARTONS/CASE CFF1-270: Brand: CUROS™

## (undated) DEVICE — BAG SPEC BIOHZRD 10 X 10 IN --

## (undated) DEVICE — BAG BELONG PT PERS CLEAR HANDL

## (undated) DEVICE — 1200 GUARD II KIT W/5MM TUBE W/O VAC TUBE: Brand: GUARDIAN

## (undated) DEVICE — BITEBLOCK ENDOSCP 60FR MAXI WHT POLYETH STURDY W/ VELC WVN

## (undated) DEVICE — FORCEPS BX L240CM JAW DIA2.8MM L CAP W/ NDL MIC MESH TOOTH

## (undated) DEVICE — (D)CUP DENT 7.5OZ PLAS DSTY -- DISC BY MFR USE ITEM 341725

## (undated) DEVICE — NDL FLTR TIP 5 MIC 18GX1.5IN --

## (undated) DEVICE — Device

## (undated) DEVICE — SIMPLICITY FLUFF UNDERPAD 23X36, MODERATE: Brand: SIMPLICITY

## (undated) DEVICE — SOLIDIFIER MEDC 1200ML -- CONVERT TO 356117

## (undated) DEVICE — SNARE ENDOSCP M L240CM W27MM SHTH DIA2.4MM CHN 2.8MM OVL

## (undated) DEVICE — BASIN EMSIS 16OZ GRAPHITE PLAS KID SHP MOLD GRAD FOR ORAL

## (undated) DEVICE — ELECTRODE,RADIOTRANSLUCENT,FOAM,3PK: Brand: MEDLINE

## (undated) DEVICE — CATH IV AUTOGRD BC BLU 22GA 25 -- INSYTE

## (undated) DEVICE — CATH IV AUTOGRD BC PNK 20GA 25 -- INSYTE

## (undated) DEVICE — SYR 3ML LL TIP 1/10ML GRAD --

## (undated) DEVICE — TRAP SUC MUCOUS 70ML -- MEDICHOICE MEDLINE

## (undated) DEVICE — SYR 5ML 1/5 GRAD LL NSAF LF --

## (undated) DEVICE — ESOPHAGEAL BALLOON DILATATION CATHETER: Brand: CRE FIXED WIRE

## (undated) DEVICE — NDL PRT INJ NSAF BLNT 18GX1.5 --

## (undated) DEVICE — SET ADMIN 16ML TBNG L100IN 2 Y INJ SITE IV PIGGY BK DISP